# Patient Record
Sex: FEMALE | Race: WHITE | Employment: OTHER | URBAN - METROPOLITAN AREA
[De-identification: names, ages, dates, MRNs, and addresses within clinical notes are randomized per-mention and may not be internally consistent; named-entity substitution may affect disease eponyms.]

---

## 2023-08-15 ENCOUNTER — HOSPITAL ENCOUNTER (INPATIENT)
Age: 83
LOS: 3 days | Discharge: HOME | DRG: 417 | End: 2023-08-18
Attending: STUDENT IN AN ORGANIZED HEALTH CARE EDUCATION/TRAINING PROGRAM | Admitting: HOSPITALIST
Payer: MEDICARE

## 2023-08-15 ENCOUNTER — APPOINTMENT (OUTPATIENT)
Dept: CT IMAGING | Age: 83
DRG: 417 | End: 2023-08-15
Payer: MEDICARE

## 2023-08-15 ENCOUNTER — APPOINTMENT (OUTPATIENT)
Dept: GENERAL RADIOLOGY | Age: 83
DRG: 417 | End: 2023-08-15
Payer: MEDICARE

## 2023-08-15 DIAGNOSIS — K85.90 ACUTE PANCREATITIS, UNSPECIFIED COMPLICATION STATUS, UNSPECIFIED PANCREATITIS TYPE: Primary | ICD-10-CM

## 2023-08-15 DIAGNOSIS — K81.0 ACUTE CHOLECYSTITIS: ICD-10-CM

## 2023-08-15 LAB
ALBUMIN SERPL-MCNC: 3.8 G/DL (ref 3.5–5.2)
ALBUMIN/GLOB SERPL: 1.5 {RATIO} (ref 1–2.5)
ALP SERPL-CCNC: 93 U/L (ref 35–104)
ALT SERPL-CCNC: 13 U/L (ref 5–33)
ANION GAP SERPL CALCULATED.3IONS-SCNC: 12 MMOL/L (ref 9–17)
AST SERPL-CCNC: 23 U/L
BASOPHILS # BLD: 0 K/UL (ref 0–0.2)
BASOPHILS NFR BLD: 1 % (ref 0–2)
BILIRUB DIRECT SERPL-MCNC: 0.1 MG/DL
BILIRUB INDIRECT SERPL-MCNC: 0.1 MG/DL (ref 0–1)
BILIRUB SERPL-MCNC: 0.2 MG/DL (ref 0.3–1.2)
BUN SERPL-MCNC: 9 MG/DL (ref 8–23)
CALCIUM SERPL-MCNC: 9.1 MG/DL (ref 8.6–10.4)
CHLORIDE SERPL-SCNC: 101 MMOL/L (ref 98–107)
CO2 SERPL-SCNC: 20 MMOL/L (ref 20–31)
CREAT SERPL-MCNC: 0.6 MG/DL (ref 0.5–0.9)
EOSINOPHIL # BLD: 0.1 K/UL (ref 0–0.4)
EOSINOPHILS RELATIVE PERCENT: 1 % (ref 1–4)
ERYTHROCYTE [DISTWIDTH] IN BLOOD BY AUTOMATED COUNT: 16 % (ref 12.5–15.4)
GFR SERPL CREATININE-BSD FRML MDRD: >60 ML/MIN/1.73M2
GLUCOSE SERPL-MCNC: 144 MG/DL (ref 70–99)
HCT VFR BLD AUTO: 34.9 % (ref 36–46)
HGB BLD-MCNC: 11.7 G/DL (ref 12–16)
LACTATE BLDV-SCNC: 1.5 MMOL/L (ref 0.5–2.2)
LIPASE SERPL-CCNC: 1080 U/L (ref 13–60)
LYMPHOCYTES NFR BLD: 2.2 K/UL (ref 1–4.8)
LYMPHOCYTES RELATIVE PERCENT: 36 % (ref 24–44)
MAGNESIUM SERPL-MCNC: 2 MG/DL (ref 1.6–2.6)
MCH RBC QN AUTO: 29.4 PG (ref 26–34)
MCHC RBC AUTO-ENTMCNC: 33.7 G/DL (ref 31–37)
MCV RBC AUTO: 87.2 FL (ref 80–100)
MONOCYTES NFR BLD: 0.8 K/UL (ref 0.1–1.2)
MONOCYTES NFR BLD: 12 % (ref 2–11)
NEUTROPHILS NFR BLD: 50 % (ref 36–66)
NEUTS SEG NFR BLD: 3.1 K/UL (ref 1.8–7.7)
PLATELET # BLD AUTO: 260 K/UL (ref 140–450)
PMV BLD AUTO: 6.6 FL (ref 6–12)
POTASSIUM SERPL-SCNC: 4.1 MMOL/L (ref 3.7–5.3)
PROT SERPL-MCNC: 6.3 G/DL (ref 6.4–8.3)
RBC # BLD AUTO: 4 M/UL (ref 4–5.2)
SODIUM SERPL-SCNC: 133 MMOL/L (ref 135–144)
TROPONIN I SERPL HS-MCNC: 7 NG/L (ref 0–14)
WBC OTHER # BLD: 6.2 K/UL (ref 3.5–11)

## 2023-08-15 PROCEDURE — 71046 X-RAY EXAM CHEST 2 VIEWS: CPT

## 2023-08-15 PROCEDURE — 36415 COLL VENOUS BLD VENIPUNCTURE: CPT

## 2023-08-15 PROCEDURE — 83605 ASSAY OF LACTIC ACID: CPT

## 2023-08-15 PROCEDURE — 99285 EMERGENCY DEPT VISIT HI MDM: CPT

## 2023-08-15 PROCEDURE — 96374 THER/PROPH/DIAG INJ IV PUSH: CPT

## 2023-08-15 PROCEDURE — 2580000003 HC RX 258: Performed by: STUDENT IN AN ORGANIZED HEALTH CARE EDUCATION/TRAINING PROGRAM

## 2023-08-15 PROCEDURE — 80048 BASIC METABOLIC PNL TOTAL CA: CPT

## 2023-08-15 PROCEDURE — 74176 CT ABD & PELVIS W/O CONTRAST: CPT

## 2023-08-15 PROCEDURE — 85025 COMPLETE CBC W/AUTO DIFF WBC: CPT

## 2023-08-15 PROCEDURE — 96375 TX/PRO/DX INJ NEW DRUG ADDON: CPT

## 2023-08-15 PROCEDURE — 83690 ASSAY OF LIPASE: CPT

## 2023-08-15 PROCEDURE — 2500000003 HC RX 250 WO HCPCS: Performed by: STUDENT IN AN ORGANIZED HEALTH CARE EDUCATION/TRAINING PROGRAM

## 2023-08-15 PROCEDURE — 6360000002 HC RX W HCPCS: Performed by: STUDENT IN AN ORGANIZED HEALTH CARE EDUCATION/TRAINING PROGRAM

## 2023-08-15 PROCEDURE — 83735 ASSAY OF MAGNESIUM: CPT

## 2023-08-15 PROCEDURE — 80076 HEPATIC FUNCTION PANEL: CPT

## 2023-08-15 PROCEDURE — 1200000000 HC SEMI PRIVATE

## 2023-08-15 PROCEDURE — 93005 ELECTROCARDIOGRAM TRACING: CPT

## 2023-08-15 PROCEDURE — 84484 ASSAY OF TROPONIN QUANT: CPT

## 2023-08-15 PROCEDURE — A4216 STERILE WATER/SALINE, 10 ML: HCPCS | Performed by: STUDENT IN AN ORGANIZED HEALTH CARE EDUCATION/TRAINING PROGRAM

## 2023-08-15 RX ORDER — 0.9 % SODIUM CHLORIDE 0.9 %
1000 INTRAVENOUS SOLUTION INTRAVENOUS ONCE
Status: COMPLETED | OUTPATIENT
Start: 2023-08-15 | End: 2023-08-16

## 2023-08-15 RX ORDER — MORPHINE SULFATE 4 MG/ML
4 INJECTION, SOLUTION INTRAMUSCULAR; INTRAVENOUS ONCE
Status: COMPLETED | OUTPATIENT
Start: 2023-08-15 | End: 2023-08-15

## 2023-08-15 RX ORDER — ONDANSETRON 2 MG/ML
4 INJECTION INTRAMUSCULAR; INTRAVENOUS ONCE
Status: COMPLETED | OUTPATIENT
Start: 2023-08-15 | End: 2023-08-15

## 2023-08-15 RX ADMIN — HYDROMORPHONE HYDROCHLORIDE 1 MG: 1 INJECTION, SOLUTION INTRAMUSCULAR; INTRAVENOUS; SUBCUTANEOUS at 23:10

## 2023-08-15 RX ADMIN — ONDANSETRON 4 MG: 2 INJECTION INTRAMUSCULAR; INTRAVENOUS at 22:52

## 2023-08-15 RX ADMIN — SODIUM CHLORIDE 1000 ML: 9 INJECTION, SOLUTION INTRAVENOUS at 22:49

## 2023-08-15 RX ADMIN — MORPHINE SULFATE 4 MG: 4 INJECTION INTRAVENOUS at 22:50

## 2023-08-15 RX ADMIN — FAMOTIDINE 20 MG: 10 INJECTION, SOLUTION INTRAVENOUS at 22:54

## 2023-08-15 ASSESSMENT — PAIN DESCRIPTION - LOCATION
LOCATION: ABDOMEN

## 2023-08-15 ASSESSMENT — PAIN SCALES - GENERAL: PAINLEVEL_OUTOF10: 10

## 2023-08-15 ASSESSMENT — PAIN - FUNCTIONAL ASSESSMENT: PAIN_FUNCTIONAL_ASSESSMENT: 0-10

## 2023-08-15 ASSESSMENT — PAIN DESCRIPTION - DESCRIPTORS: DESCRIPTORS: SHARP

## 2023-08-15 ASSESSMENT — PAIN DESCRIPTION - ORIENTATION
ORIENTATION: MID
ORIENTATION: MID;UPPER

## 2023-08-15 ASSESSMENT — PAIN DESCRIPTION - PAIN TYPE: TYPE: ACUTE PAIN

## 2023-08-16 ENCOUNTER — APPOINTMENT (OUTPATIENT)
Dept: MRI IMAGING | Age: 83
DRG: 417 | End: 2023-08-16
Payer: MEDICARE

## 2023-08-16 ENCOUNTER — ANESTHESIA EVENT (OUTPATIENT)
Dept: OPERATING ROOM | Age: 83
End: 2023-08-16
Payer: MEDICARE

## 2023-08-16 LAB
BACTERIA URNS QL MICRO: ABNORMAL
BILIRUB UR QL STRIP: NEGATIVE
CA-I BLD-SCNC: 1.15 MMOL/L (ref 1.13–1.33)
CHARACTER UR: ABNORMAL
CLARITY UR: CLEAR
COLOR UR: YELLOW
EKG ATRIAL RATE: 51 BPM
EKG P AXIS: 64 DEGREES
EKG P-R INTERVAL: 200 MS
EKG Q-T INTERVAL: 460 MS
EKG QRS DURATION: 76 MS
EKG QTC CALCULATION (BAZETT): 423 MS
EKG R AXIS: 67 DEGREES
EKG T AXIS: 51 DEGREES
EKG VENTRICULAR RATE: 51 BPM
EPI CELLS #/AREA URNS HPF: ABNORMAL /HPF (ref 0–5)
GLUCOSE UR STRIP-MCNC: NEGATIVE MG/DL
HGB UR QL STRIP.AUTO: ABNORMAL
INR PPP: 1.1
KETONES UR STRIP-MCNC: ABNORMAL MG/DL
LEUKOCYTE ESTERASE UR QL STRIP: ABNORMAL
LIPASE SERPL-CCNC: 648 U/L (ref 13–60)
MAGNESIUM SERPL-MCNC: 1.8 MG/DL (ref 1.6–2.6)
MUCOUS THREADS URNS QL MICRO: ABNORMAL
NITRITE UR QL STRIP: NEGATIVE
PH UR STRIP: 6 [PH] (ref 5–8)
PHOSPHATE SERPL-MCNC: 3.3 MG/DL (ref 2.6–4.5)
PROT UR STRIP-MCNC: NEGATIVE MG/DL
PROTHROMBIN TIME: 11.4 SEC (ref 9.4–12.6)
RBC #/AREA URNS HPF: ABNORMAL /HPF (ref 0–2)
SP GR UR STRIP: 1.02 (ref 1–1.03)
TRIGL SERPL-MCNC: 40 MG/DL
TRIGL SERPL-MCNC: 44 MG/DL
UROBILINOGEN UR STRIP-ACNC: NORMAL EU/DL (ref 0–1)
WBC #/AREA URNS HPF: ABNORMAL /HPF (ref 0–5)

## 2023-08-16 PROCEDURE — 99222 1ST HOSP IP/OBS MODERATE 55: CPT | Performed by: HOSPITALIST

## 2023-08-16 PROCEDURE — 6360000002 HC RX W HCPCS: Performed by: NURSE PRACTITIONER

## 2023-08-16 PROCEDURE — 36415 COLL VENOUS BLD VENIPUNCTURE: CPT

## 2023-08-16 PROCEDURE — 74181 MRI ABDOMEN W/O CONTRAST: CPT

## 2023-08-16 PROCEDURE — 84478 ASSAY OF TRIGLYCERIDES: CPT

## 2023-08-16 PROCEDURE — 2580000003 HC RX 258: Performed by: NURSE PRACTITIONER

## 2023-08-16 PROCEDURE — 85610 PROTHROMBIN TIME: CPT

## 2023-08-16 PROCEDURE — 6360000002 HC RX W HCPCS: Performed by: HOSPITALIST

## 2023-08-16 PROCEDURE — 82330 ASSAY OF CALCIUM: CPT

## 2023-08-16 PROCEDURE — 81001 URINALYSIS AUTO W/SCOPE: CPT

## 2023-08-16 PROCEDURE — 83735 ASSAY OF MAGNESIUM: CPT

## 2023-08-16 PROCEDURE — 1200000000 HC SEMI PRIVATE

## 2023-08-16 PROCEDURE — 83690 ASSAY OF LIPASE: CPT

## 2023-08-16 PROCEDURE — 97166 OT EVAL MOD COMPLEX 45 MIN: CPT

## 2023-08-16 PROCEDURE — 6370000000 HC RX 637 (ALT 250 FOR IP): Performed by: HOSPITALIST

## 2023-08-16 PROCEDURE — 84100 ASSAY OF PHOSPHORUS: CPT

## 2023-08-16 PROCEDURE — 97535 SELF CARE MNGMENT TRAINING: CPT

## 2023-08-16 RX ORDER — SODIUM CHLORIDE 9 MG/ML
INJECTION, SOLUTION INTRAVENOUS CONTINUOUS
Status: DISCONTINUED | OUTPATIENT
Start: 2023-08-16 | End: 2023-08-18

## 2023-08-16 RX ORDER — DIPHENHYDRAMINE HCL 25 MG
25 TABLET ORAL EVERY 6 HOURS PRN
Status: DISCONTINUED | OUTPATIENT
Start: 2023-08-16 | End: 2023-08-18 | Stop reason: HOSPADM

## 2023-08-16 RX ORDER — ASPIRIN 325 MG
325 TABLET ORAL DAILY
Status: DISCONTINUED | OUTPATIENT
Start: 2023-08-16 | End: 2023-08-18 | Stop reason: HOSPADM

## 2023-08-16 RX ORDER — LISINOPRIL 10 MG/1
10 TABLET ORAL DAILY
Status: DISCONTINUED | OUTPATIENT
Start: 2023-08-16 | End: 2023-08-16

## 2023-08-16 RX ORDER — PANTOPRAZOLE SODIUM 40 MG/1
40 TABLET, DELAYED RELEASE ORAL
Status: DISCONTINUED | OUTPATIENT
Start: 2023-08-17 | End: 2023-08-18 | Stop reason: HOSPADM

## 2023-08-16 RX ORDER — OMEPRAZOLE 20 MG/1
20 CAPSULE, DELAYED RELEASE ORAL DAILY
COMMUNITY

## 2023-08-16 RX ORDER — EZETIMIBE 10 MG/1
10 TABLET ORAL DAILY
COMMUNITY

## 2023-08-16 RX ORDER — ALPRAZOLAM 0.25 MG/1
0.25 TABLET ORAL NIGHTLY PRN
COMMUNITY

## 2023-08-16 RX ORDER — LATANOPROST 50 UG/ML
1 SOLUTION/ DROPS OPHTHALMIC NIGHTLY
Status: DISCONTINUED | OUTPATIENT
Start: 2023-08-16 | End: 2023-08-18 | Stop reason: HOSPADM

## 2023-08-16 RX ORDER — MAGNESIUM SULFATE 1 G/100ML
1000 INJECTION INTRAVENOUS PRN
Status: DISCONTINUED | OUTPATIENT
Start: 2023-08-16 | End: 2023-08-18 | Stop reason: HOSPADM

## 2023-08-16 RX ORDER — METOPROLOL SUCCINATE 50 MG/1
50 TABLET, EXTENDED RELEASE ORAL DAILY
COMMUNITY

## 2023-08-16 RX ORDER — LISINOPRIL 10 MG/1
10 TABLET ORAL NIGHTLY
Status: DISCONTINUED | OUTPATIENT
Start: 2023-08-16 | End: 2023-08-18 | Stop reason: HOSPADM

## 2023-08-16 RX ORDER — DIPHENHYDRAMINE HCL 25 MG
25 CAPSULE ORAL EVERY 6 HOURS PRN
COMMUNITY

## 2023-08-16 RX ORDER — POTASSIUM CHLORIDE 7.45 MG/ML
10 INJECTION INTRAVENOUS PRN
Status: DISCONTINUED | OUTPATIENT
Start: 2023-08-16 | End: 2023-08-18 | Stop reason: HOSPADM

## 2023-08-16 RX ORDER — SODIUM CHLORIDE 0.9 % (FLUSH) 0.9 %
5-40 SYRINGE (ML) INJECTION PRN
Status: DISCONTINUED | OUTPATIENT
Start: 2023-08-16 | End: 2023-08-18 | Stop reason: HOSPADM

## 2023-08-16 RX ORDER — SODIUM CHLORIDE 9 MG/ML
INJECTION, SOLUTION INTRAVENOUS PRN
Status: DISCONTINUED | OUTPATIENT
Start: 2023-08-16 | End: 2023-08-18 | Stop reason: HOSPADM

## 2023-08-16 RX ORDER — EZETIMIBE 10 MG/1
10 TABLET ORAL DAILY
Status: DISCONTINUED | OUTPATIENT
Start: 2023-08-16 | End: 2023-08-18 | Stop reason: HOSPADM

## 2023-08-16 RX ORDER — LISINOPRIL 10 MG/1
10 TABLET ORAL DAILY
COMMUNITY

## 2023-08-16 RX ORDER — METOPROLOL SUCCINATE 50 MG/1
50 TABLET, EXTENDED RELEASE ORAL DAILY
Status: DISCONTINUED | OUTPATIENT
Start: 2023-08-16 | End: 2023-08-18 | Stop reason: HOSPADM

## 2023-08-16 RX ORDER — ENOXAPARIN SODIUM 100 MG/ML
40 INJECTION SUBCUTANEOUS DAILY
Status: DISCONTINUED | OUTPATIENT
Start: 2023-08-16 | End: 2023-08-17

## 2023-08-16 RX ORDER — SODIUM CHLORIDE 0.9 % (FLUSH) 0.9 %
5-40 SYRINGE (ML) INJECTION EVERY 12 HOURS SCHEDULED
Status: DISCONTINUED | OUTPATIENT
Start: 2023-08-16 | End: 2023-08-18 | Stop reason: HOSPADM

## 2023-08-16 RX ORDER — ESTRADIOL 0.1 MG/G
2 CREAM VAGINAL
COMMUNITY

## 2023-08-16 RX ORDER — ESTRADIOL 0.1 MG/G
2 CREAM VAGINAL
Status: DISCONTINUED | OUTPATIENT
Start: 2023-08-16 | End: 2023-08-18 | Stop reason: HOSPADM

## 2023-08-16 RX ORDER — ASPIRIN 325 MG
325 TABLET ORAL DAILY
COMMUNITY

## 2023-08-16 RX ORDER — ONDANSETRON 2 MG/ML
4 INJECTION INTRAMUSCULAR; INTRAVENOUS EVERY 6 HOURS PRN
Status: DISCONTINUED | OUTPATIENT
Start: 2023-08-16 | End: 2023-08-18 | Stop reason: HOSPADM

## 2023-08-16 RX ORDER — ALPRAZOLAM 0.25 MG/1
0.25 TABLET ORAL NIGHTLY PRN
Status: DISCONTINUED | OUTPATIENT
Start: 2023-08-16 | End: 2023-08-18 | Stop reason: HOSPADM

## 2023-08-16 RX ORDER — PROCHLORPERAZINE EDISYLATE 5 MG/ML
10 INJECTION INTRAMUSCULAR; INTRAVENOUS EVERY 6 HOURS PRN
Status: DISCONTINUED | OUTPATIENT
Start: 2023-08-16 | End: 2023-08-18 | Stop reason: HOSPADM

## 2023-08-16 RX ORDER — ONDANSETRON 4 MG/1
4 TABLET, ORALLY DISINTEGRATING ORAL EVERY 8 HOURS PRN
Status: DISCONTINUED | OUTPATIENT
Start: 2023-08-16 | End: 2023-08-18 | Stop reason: HOSPADM

## 2023-08-16 RX ADMIN — ENOXAPARIN SODIUM 40 MG: 40 INJECTION SUBCUTANEOUS at 09:36

## 2023-08-16 RX ADMIN — ESTRADIOL 2 G: 0.1 CREAM VAGINAL at 20:35

## 2023-08-16 RX ADMIN — SODIUM CHLORIDE, PRESERVATIVE FREE 10 ML: 5 INJECTION INTRAVENOUS at 20:09

## 2023-08-16 RX ADMIN — LATANOPROST 1 DROP: 50 SOLUTION OPHTHALMIC at 20:49

## 2023-08-16 RX ADMIN — SODIUM CHLORIDE: 9 INJECTION, SOLUTION INTRAVENOUS at 19:23

## 2023-08-16 RX ADMIN — ONDANSETRON 4 MG: 2 INJECTION INTRAMUSCULAR; INTRAVENOUS at 20:30

## 2023-08-16 RX ADMIN — SODIUM CHLORIDE: 9 INJECTION, SOLUTION INTRAVENOUS at 11:28

## 2023-08-16 RX ADMIN — PROCHLORPERAZINE EDISYLATE 10 MG: 5 INJECTION INTRAMUSCULAR; INTRAVENOUS at 15:26

## 2023-08-16 RX ADMIN — PROCHLORPERAZINE EDISYLATE 10 MG: 5 INJECTION INTRAMUSCULAR; INTRAVENOUS at 09:36

## 2023-08-16 RX ADMIN — LISINOPRIL 10 MG: 10 TABLET ORAL at 22:09

## 2023-08-16 RX ADMIN — HYDROMORPHONE HYDROCHLORIDE 0.5 MG: 1 INJECTION, SOLUTION INTRAMUSCULAR; INTRAVENOUS; SUBCUTANEOUS at 02:24

## 2023-08-16 RX ADMIN — SODIUM CHLORIDE: 9 INJECTION, SOLUTION INTRAVENOUS at 01:17

## 2023-08-16 RX ADMIN — HYDROMORPHONE HYDROCHLORIDE 0.5 MG: 1 INJECTION, SOLUTION INTRAMUSCULAR; INTRAVENOUS; SUBCUTANEOUS at 12:00

## 2023-08-16 RX ADMIN — HYDROMORPHONE HYDROCHLORIDE 0.5 MG: 1 INJECTION, SOLUTION INTRAMUSCULAR; INTRAVENOUS; SUBCUTANEOUS at 05:24

## 2023-08-16 RX ADMIN — ONDANSETRON 4 MG: 2 INJECTION INTRAMUSCULAR; INTRAVENOUS at 05:26

## 2023-08-16 RX ADMIN — ONDANSETRON 4 MG: 2 INJECTION INTRAMUSCULAR; INTRAVENOUS at 11:59

## 2023-08-16 ASSESSMENT — PAIN SCALES - GENERAL
PAINLEVEL_OUTOF10: 7
PAINLEVEL_OUTOF10: 10
PAINLEVEL_OUTOF10: 0
PAINLEVEL_OUTOF10: 4

## 2023-08-16 ASSESSMENT — PAIN DESCRIPTION - LOCATION: LOCATION: ABDOMEN

## 2023-08-16 NOTE — ED NOTES
SBAR report to [facility] for admission     Keesha Cornejo RN  08/16/23 7543

## 2023-08-16 NOTE — PROGRESS NOTES
Physical Therapy      Physical Therapy Cancel Note      DATE: 2023    NAME: Cheyenne Webber  MRN: [id number]   : 1940      Patient not seen this date for Physical Therapy due to: Other: Patient out of room for MRI Abdomen. Will continue to pursue PT evaluation as able.        Electronically signed by Barbara Camilo PT on 2023 at 9:55 AM

## 2023-08-16 NOTE — PROGRESS NOTES
No  Subjective  Subjective: Patient reports 8/10 abdominal pain . General Comment  Comments: Patient had several episodes of vomiting throughout session. Social/Functional History  Social/Functional History  Lives With: Spouse, Son (adult son works nights)  Type of Home: Condo  Home Layout: Two level, Able to Live on Main level with bedroom/bathroom  Home Access: Stairs to enter without rails  Entrance Stairs - Number of Steps: 1  Bathroom Shower/Tub: Walk-in shower, Shower chair without back  Bathroom Toilet: Standard  Bathroom Equipment: Grab bars around toilet, Grab bars in shower, Hand-held shower, Shower chair  Home Equipment: Grab bars  Has the patient had two or more falls in the past year or any fall with injury in the past year?: No  ADL Assistance: [address] Independent (hired cleaning 2x/month)  Ambulation Assistance: Independent  Transfer Assistance: Independent       Objective              Safety Devices  Type of Devices: Call light within reach; Bed alarm in place; Left in bed;Nurse notified  Restraints  Restraints Initially in Place: No    Balance  Sitting: Intact  Standing: Impaired (static-SBA, dynamic-CGA)    Functional mobility  Overall Level of Assistance: Contact-guard assistance (Pt sitting on BSC up on entering. BSC to bed, patient declined up in chair due to nausea/vomiting)  Interventions: Verbal cues  Assistive Device:  (none)    Toilet Transfers  Toilet - Technique: Stand step  Equipment Used: Standard bedside commode  Toilet Transfer: Stand by assistance    AROM: Within functional limits  Strength:  Within functional limits  Coordination: Within functional limits    ADL  Feeding: Independent  Feeding Skilled Clinical Factors: ROM/strength WFL for completion  Grooming: Minimal assistance  Grooming Skilled Clinical Factors: in supine with head of bed elevated, patient able to brush teeth and began to brush partial and then began vomiting, assist to finish partial. Pt able to remove and replace partial in mouth. UE Bathing: Stand by assistance  UE Bathing Skilled Clinical Factors: wash face/hands  LE Bathing: Contact guard assistance  LE Bathing Skilled Clinical Factors: clinical judgement based on strength, balance and functional mobility  UE Dressing: Stand by assistance  UE Dressing Skilled Clinical Factors: clinical judgement based on strength, balance and functional mobility  LE Dressing: Maximum assistance  LE Dressing Skilled Clinical Factors: willy slipper socks due to vomiting  Toileting: Stand by assistance  Toileting Skilled Clinical Factors: hygiene and clothing management        Bed mobility  Sit to Supine: Stand by assistance  Scooting: Independent  Bed Mobility Comments: patient sitting on BSC upon entering room    Transfers  Sit to stand: Stand by assistance  Stand to sit: Stand by assistance    Vision  Vision: Impaired  Vision Exceptions: Wears glasses at all times (has glaucoma)  Hearing  Hearing: Within functional limits  Cognition  Overall Cognitive Status: WNL  Orientation  Overall Orientation Status: Within Normal Limits  Orientation Level: Oriented X4                  Education Given To: Patient  Education Provided: Transfer Training;Role of Therapy;Plan of Care;ADL Adaptive Strategies; Fall Prevention Strategies  Education Method: Demonstration;Verbal  Education Outcome: Verbalized understanding;Continued education needed           Hand Dominance  Hand Dominance: Right         AM-PAC Score        AM-PAC Inpatient Daily Activity Raw Score: 18 (08/16/23 0911)  AM-PAC Inpatient ADL T-Scale Score : 38.66 (08/16/23 0911)  ADL Inpatient CMS 0-100% Score: 46.65 (08/16/23 0911)  ADL Inpatient CMS G-Code Modifier : CK (08/16/23 0911)        Goals  Short Term Goals  Time Frame for Short Term Goals: 14 visits  Short Term Goal 1: Pt will complete total body bath/dress with MOD IND  Short Term Goal 2: Pt will complete grooming sink side with MOD IND  Short

## 2023-08-16 NOTE — H&P
[facility]  Office: [phone]  Mitzi Rodas, DO, Krys Whitman, DO, Darrick Essex, DO, Nenita Kulkarni, DO, Cristin Gurrola MD, Lloyd Manzo MD, Chris Gustafson MD, Kait Gonzalez MD,  Kelly Briggs MD, Juanpablo Ortega MD, Nan Vogt, DO, Anastasia Rios MD,  Rusty Gamboa MD, Cathleen Vizcaino MD, Marilynn Russell DO, Shabbir Siddiqui MD,  Kelby Mahajan, DO, Beverly Webb MD, Felicia Fan MD, Eva Harris MD, Julisa Benoit MD,  Tomas Ngo MD, Bethany Segura MD, Rocio Vazquez MD, Johanne Lucas DO, Santana Lang MD,  Lidia Lang MD, Shirley Abraham, Anson Tsang, CNP, Tamra Cerna, CNP, Cullen Dominique, CNP,  Jerilyn Sesay, LINDA, Edgar Olivarez, CNP, Kenia Diallo, CNP, Gaviota Masters, CNP, Jesús Cope CNP, Clare Bahena, CNP, Denver Robertson PA-C, Andi Giron, JEFFREY, Thierry Botello CNP, Magaly Menard, [address] [place] [address] Drive    HISTORY AND PHYSICAL EXAMINATION            Date:   8/16/2023  Patient name:  Adalgisa Gaona  Date of admission:  8/15/2023  9:47 PM  MRN:   [id number]  Account:  [de-identified]  YOB: 1940  PCP:    No primary care provider on file. Room:   [address] [place] [address]  Code Status:    Full Code      History Obtained From:     patient, electronic medical record    History of Present Illness:     Adalgisa Gaona is a 81 y.o. Non- / non  female who presents with Abdominal Pain (Upper quadrants abdominal pains that started about 1 hour ago. Onset after eating dinner tonight. )   and is admitted to the hospital for the management of Acute pancreatitis without infection or necrosis. This very pleasant 41-year-old female presents to the hospital with acute onset of upper abdominal pain. The patient has been found to have acute pancreatitis. She does not have any significant risk factors for acute pancreatitis outside of known cholelithiasis.   She does not drink steatosis. No focal liver lesion noting some decreased sensitivity on this unenhanced study. Assessment :      Hospital Problems             Last Modified POA    * (Principal) Acute pancreatitis without infection or necrosis 8/15/2023 Yes       Plan:     Acute pancreatitis/acute cholecystitis  Suspect patient may have had microlithiasis with a passed stone causing her acute pancreatitis  Lipase improving  Consult general surgery due to acute cholecystitis noted on MRCP  Continue Dilaudid for pain control  Okay for clear liquid diet for now  Continue Compazine for nausea  Essential hypertension  Resume lisinopril and Toprol  Anxiety  Continue Xanax    Patient is admitted as inpatient status because of co-morbidities listed above, severity of signs and symptoms as outlined, requirement for current medical therapies and most importantly because of direct risk to patient if care not provided in a hospital setting. Expected length of stay > 48 hours. Patient is admitted in the Med/Surge    Medical Decision Makin Arlette Valera DO  2023  1:00 PM    Copy sent to Dr. Avtar Wong primary care provider on file.

## 2023-08-16 NOTE — ED PROVIDER NOTES
[facility]  Emergency Department Encounter     Pt Yony Kraft  MRN: [id number]  [address] 1940  Date of evaluation: 8/15/23  PCP:  No primary care provider on file. CHIEF COMPLAINT       Chief Complaint   Patient presents with    Abdominal Pain     Upper quadrants abdominal pains that started about 1 hour ago. Onset after eating dinner tonight. HISTORY OF PRESENT ILLNESS  (Location/Symptom, Timing/Onset, Context/Setting, Quality, Duration, Modifying Factors, Severity.)      Keena Bryan is a 81 y.o. female who presents with acute sudden onset abdominal pain, midepigastric pain about 1 hour to arrival.  No medications prior to arrival.  Started all of a sudden. She felt like her bra strap may have been a little bit tight and therefore she loosened although that did not help the pain. Reports sharp pain. No radiation. No chest pain. No shortness of breath. Did become diaphoretic at the time. Sweaty at the time. Did attempt to vomit and that did not help with her pain either. Positive for nausea. Positive for vomiting. No blood in her emesis. No diarrhea. No constipation. Past medical history significant for nonalcoholic fatty liver. PAST MEDICAL / SURGICAL / SOCIAL / FAMILY HISTORY      has no past medical history on file. has no past surgical history on file.       Social History     Socioeconomic History    Marital status:      Spouse name: Not on file    Number of children: Not on file    Years of education: Not on file    Highest education level: Not on file   Occupational History    Not on file   Tobacco Use    Smoking status: Not on file    Smokeless tobacco: Not on file   Substance and Sexual Activity    Alcohol use: Not on file    Drug use: Not on file    Sexual activity: Not on file   Other Topics Concern    Not on file   Social History Narrative    Not on file     Social Determinants of Health     Financial

## 2023-08-17 ENCOUNTER — ANESTHESIA (OUTPATIENT)
Dept: OPERATING ROOM | Age: 83
End: 2023-08-17
Payer: MEDICARE

## 2023-08-17 LAB
ALBUMIN SERPL-MCNC: 3 G/DL (ref 3.5–5.2)
ALBUMIN/GLOB SERPL: 1.3 {RATIO} (ref 1–2.5)
ALP SERPL-CCNC: 68 U/L (ref 35–104)
ALT SERPL-CCNC: 11 U/L (ref 5–33)
ANION GAP SERPL CALCULATED.3IONS-SCNC: 8 MMOL/L (ref 9–17)
AST SERPL-CCNC: 25 U/L
BILIRUB SERPL-MCNC: 0.5 MG/DL (ref 0.3–1.2)
BUN SERPL-MCNC: 12 MG/DL (ref 8–23)
CALCIUM SERPL-MCNC: 8 MG/DL (ref 8.6–10.4)
CHLORIDE SERPL-SCNC: 108 MMOL/L (ref 98–107)
CO2 SERPL-SCNC: 19 MMOL/L (ref 20–31)
CREAT SERPL-MCNC: 0.4 MG/DL (ref 0.5–0.9)
EKG ATRIAL RATE: 96 BPM
EKG P AXIS: 73 DEGREES
EKG P-R INTERVAL: 184 MS
EKG Q-T INTERVAL: 350 MS
EKG QRS DURATION: 88 MS
EKG QTC CALCULATION (BAZETT): 442 MS
EKG R AXIS: 66 DEGREES
EKG T AXIS: -178 DEGREES
EKG VENTRICULAR RATE: 96 BPM
GFR SERPL CREATININE-BSD FRML MDRD: >60 ML/MIN/1.73M2
GLUCOSE BLD-MCNC: 167 MG/DL (ref 65–105)
GLUCOSE BLD-MCNC: 99 MG/DL (ref 65–105)
GLUCOSE SERPL-MCNC: 98 MG/DL (ref 70–99)
LIPASE SERPL-CCNC: 22 U/L (ref 13–60)
POTASSIUM SERPL-SCNC: 4.4 MMOL/L (ref 3.7–5.3)
PROT SERPL-MCNC: 5.4 G/DL (ref 6.4–8.3)
SODIUM SERPL-SCNC: 135 MMOL/L (ref 135–144)
TROPONIN I SERPL HS-MCNC: 11 NG/L (ref 0–14)

## 2023-08-17 PROCEDURE — 6360000002 HC RX W HCPCS: Performed by: STUDENT IN AN ORGANIZED HEALTH CARE EDUCATION/TRAINING PROGRAM

## 2023-08-17 PROCEDURE — 1200000000 HC SEMI PRIVATE

## 2023-08-17 PROCEDURE — 51701 INSERT BLADDER CATHETER: CPT

## 2023-08-17 PROCEDURE — 51798 US URINE CAPACITY MEASURE: CPT

## 2023-08-17 PROCEDURE — 97530 THERAPEUTIC ACTIVITIES: CPT

## 2023-08-17 PROCEDURE — 82947 ASSAY GLUCOSE BLOOD QUANT: CPT

## 2023-08-17 PROCEDURE — 7100000001 HC PACU RECOVERY - ADDTL 15 MIN: Performed by: SURGERY

## 2023-08-17 PROCEDURE — 2500000003 HC RX 250 WO HCPCS: Performed by: NURSE ANESTHETIST, CERTIFIED REGISTERED

## 2023-08-17 PROCEDURE — 2580000003 HC RX 258: Performed by: NURSE PRACTITIONER

## 2023-08-17 PROCEDURE — 6370000000 HC RX 637 (ALT 250 FOR IP): Performed by: STUDENT IN AN ORGANIZED HEALTH CARE EDUCATION/TRAINING PROGRAM

## 2023-08-17 PROCEDURE — 2720000010 HC SURG SUPPLY STERILE: Performed by: SURGERY

## 2023-08-17 PROCEDURE — 6360000002 HC RX W HCPCS

## 2023-08-17 PROCEDURE — 0FB44ZZ EXCISION OF GALLBLADDER, PERCUTANEOUS ENDOSCOPIC APPROACH: ICD-10-PCS | Performed by: SURGERY

## 2023-08-17 PROCEDURE — 6360000002 HC RX W HCPCS: Performed by: NURSE ANESTHETIST, CERTIFIED REGISTERED

## 2023-08-17 PROCEDURE — 36415 COLL VENOUS BLD VENIPUNCTURE: CPT

## 2023-08-17 PROCEDURE — 94760 N-INVAS EAR/PLS OXIMETRY 1: CPT

## 2023-08-17 PROCEDURE — 3600000019 HC SURGERY ROBOT ADDTL 15MIN: Performed by: SURGERY

## 2023-08-17 PROCEDURE — 99232 SBSQ HOSP IP/OBS MODERATE 35: CPT | Performed by: HOSPITALIST

## 2023-08-17 PROCEDURE — 84484 ASSAY OF TROPONIN QUANT: CPT

## 2023-08-17 PROCEDURE — 2709999900 HC NON-CHARGEABLE SUPPLY: Performed by: SURGERY

## 2023-08-17 PROCEDURE — 8E0W4CZ ROBOTIC ASSISTED PROCEDURE OF TRUNK REGION, PERCUTANEOUS ENDOSCOPIC APPROACH: ICD-10-PCS | Performed by: SURGERY

## 2023-08-17 PROCEDURE — 2500000003 HC RX 250 WO HCPCS

## 2023-08-17 PROCEDURE — 3700000001 HC ADD 15 MINUTES (ANESTHESIA): Performed by: SURGERY

## 2023-08-17 PROCEDURE — 80053 COMPREHEN METABOLIC PANEL: CPT

## 2023-08-17 PROCEDURE — 7100000000 HC PACU RECOVERY - FIRST 15 MIN: Performed by: SURGERY

## 2023-08-17 PROCEDURE — 2580000003 HC RX 258: Performed by: STUDENT IN AN ORGANIZED HEALTH CARE EDUCATION/TRAINING PROGRAM

## 2023-08-17 PROCEDURE — 88304 TISSUE EXAM BY PATHOLOGIST: CPT

## 2023-08-17 PROCEDURE — 2580000003 HC RX 258

## 2023-08-17 PROCEDURE — S2900 ROBOTIC SURGICAL SYSTEM: HCPCS | Performed by: SURGERY

## 2023-08-17 PROCEDURE — 83690 ASSAY OF LIPASE: CPT

## 2023-08-17 PROCEDURE — 6360000002 HC RX W HCPCS: Performed by: HOSPITALIST

## 2023-08-17 PROCEDURE — 3700000000 HC ANESTHESIA ATTENDED CARE: Performed by: SURGERY

## 2023-08-17 PROCEDURE — 2500000003 HC RX 250 WO HCPCS: Performed by: SURGERY

## 2023-08-17 PROCEDURE — 3600000009 HC SURGERY ROBOT BASE: Performed by: SURGERY

## 2023-08-17 RX ORDER — METRONIDAZOLE 500 MG/100ML
500 INJECTION, SOLUTION INTRAVENOUS EVERY 8 HOURS
Status: DISCONTINUED | OUTPATIENT
Start: 2023-08-17 | End: 2023-08-18 | Stop reason: HOSPADM

## 2023-08-17 RX ORDER — ONDANSETRON 2 MG/ML
INJECTION INTRAMUSCULAR; INTRAVENOUS PRN
Status: DISCONTINUED | OUTPATIENT
Start: 2023-08-17 | End: 2023-08-17 | Stop reason: SDUPTHER

## 2023-08-17 RX ORDER — CIPROFLOXACIN 2 MG/ML
400 INJECTION, SOLUTION INTRAVENOUS EVERY 12 HOURS
Status: DISCONTINUED | OUTPATIENT
Start: 2023-08-17 | End: 2023-08-18 | Stop reason: HOSPADM

## 2023-08-17 RX ORDER — ONDANSETRON 2 MG/ML
INJECTION INTRAMUSCULAR; INTRAVENOUS
Status: COMPLETED
Start: 2023-08-17 | End: 2023-08-17

## 2023-08-17 RX ORDER — CEFAZOLIN 2 G/1
INJECTION, POWDER, FOR SOLUTION INTRAMUSCULAR; INTRAVENOUS
Status: DISPENSED
Start: 2023-08-17 | End: 2023-08-17

## 2023-08-17 RX ORDER — PROPOFOL 10 MG/ML
INJECTION, EMULSION INTRAVENOUS PRN
Status: DISCONTINUED | OUTPATIENT
Start: 2023-08-17 | End: 2023-08-17 | Stop reason: SDUPTHER

## 2023-08-17 RX ORDER — FENTANYL CITRATE 50 UG/ML
INJECTION, SOLUTION INTRAMUSCULAR; INTRAVENOUS PRN
Status: DISCONTINUED | OUTPATIENT
Start: 2023-08-17 | End: 2023-08-17 | Stop reason: SDUPTHER

## 2023-08-17 RX ORDER — NEOSTIGMINE METHYLSULFATE 5 MG/5 ML
SYRINGE (ML) INTRAVENOUS PRN
Status: DISCONTINUED | OUTPATIENT
Start: 2023-08-17 | End: 2023-08-17 | Stop reason: SDUPTHER

## 2023-08-17 RX ORDER — METOCLOPRAMIDE HYDROCHLORIDE 5 MG/ML
10 INJECTION INTRAMUSCULAR; INTRAVENOUS
Status: DISCONTINUED | OUTPATIENT
Start: 2023-08-17 | End: 2023-08-17

## 2023-08-17 RX ORDER — LABETALOL HYDROCHLORIDE 5 MG/ML
10 INJECTION, SOLUTION INTRAVENOUS
Status: DISCONTINUED | OUTPATIENT
Start: 2023-08-17 | End: 2023-08-17

## 2023-08-17 RX ORDER — DIPHENHYDRAMINE HYDROCHLORIDE 50 MG/ML
12.5 INJECTION INTRAMUSCULAR; INTRAVENOUS
Status: DISCONTINUED | OUTPATIENT
Start: 2023-08-17 | End: 2023-08-17

## 2023-08-17 RX ORDER — ESMOLOL HYDROCHLORIDE 10 MG/ML
INJECTION INTRAVENOUS PRN
Status: DISCONTINUED | OUTPATIENT
Start: 2023-08-17 | End: 2023-08-17 | Stop reason: SDUPTHER

## 2023-08-17 RX ORDER — OXYCODONE HYDROCHLORIDE 5 MG/1
10 TABLET ORAL PRN
Status: DISCONTINUED | OUTPATIENT
Start: 2023-08-17 | End: 2023-08-17

## 2023-08-17 RX ORDER — MEPERIDINE HYDROCHLORIDE 50 MG/ML
12.5 INJECTION INTRAMUSCULAR; INTRAVENOUS; SUBCUTANEOUS ONCE
Status: COMPLETED | OUTPATIENT
Start: 2023-08-17 | End: 2023-08-17

## 2023-08-17 RX ORDER — ROCURONIUM BROMIDE 10 MG/ML
INJECTION, SOLUTION INTRAVENOUS PRN
Status: DISCONTINUED | OUTPATIENT
Start: 2023-08-17 | End: 2023-08-17 | Stop reason: SDUPTHER

## 2023-08-17 RX ORDER — MEPERIDINE HYDROCHLORIDE 50 MG/ML
INJECTION INTRAMUSCULAR; INTRAVENOUS; SUBCUTANEOUS
Status: COMPLETED
Start: 2023-08-17 | End: 2023-08-17

## 2023-08-17 RX ORDER — HYDRALAZINE HYDROCHLORIDE 20 MG/ML
10 INJECTION INTRAMUSCULAR; INTRAVENOUS
Status: DISCONTINUED | OUTPATIENT
Start: 2023-08-17 | End: 2023-08-17

## 2023-08-17 RX ORDER — INDOCYANINE GREEN AND WATER 25 MG
KIT INJECTION
Status: COMPLETED
Start: 2023-08-17 | End: 2023-08-17

## 2023-08-17 RX ORDER — MORPHINE SULFATE 2 MG/ML
1 INJECTION, SOLUTION INTRAMUSCULAR; INTRAVENOUS EVERY 5 MIN PRN
Status: DISCONTINUED | OUTPATIENT
Start: 2023-08-17 | End: 2023-08-17

## 2023-08-17 RX ORDER — METOPROLOL TARTRATE 5 MG/5ML
INJECTION INTRAVENOUS
Status: DISPENSED
Start: 2023-08-17 | End: 2023-08-17

## 2023-08-17 RX ORDER — OXYCODONE HYDROCHLORIDE 5 MG/1
5 TABLET ORAL PRN
Status: DISCONTINUED | OUTPATIENT
Start: 2023-08-17 | End: 2023-08-17

## 2023-08-17 RX ORDER — BUPIVACAINE HYDROCHLORIDE AND EPINEPHRINE 5; 5 MG/ML; UG/ML
INJECTION, SOLUTION EPIDURAL; INTRACAUDAL; PERINEURAL PRN
Status: DISCONTINUED | OUTPATIENT
Start: 2023-08-17 | End: 2023-08-17 | Stop reason: ALTCHOICE

## 2023-08-17 RX ORDER — MIDAZOLAM HYDROCHLORIDE 2 MG/2ML
2 INJECTION, SOLUTION INTRAMUSCULAR; INTRAVENOUS
Status: DISCONTINUED | OUTPATIENT
Start: 2023-08-17 | End: 2023-08-17

## 2023-08-17 RX ORDER — INDOCYANINE GREEN AND WATER 25 MG
5 KIT INJECTION ONCE
Status: COMPLETED | OUTPATIENT
Start: 2023-08-17 | End: 2023-08-17

## 2023-08-17 RX ORDER — ENOXAPARIN SODIUM 100 MG/ML
40 INJECTION SUBCUTANEOUS DAILY
Status: DISCONTINUED | OUTPATIENT
Start: 2023-08-18 | End: 2023-08-18 | Stop reason: HOSPADM

## 2023-08-17 RX ORDER — KETOROLAC TROMETHAMINE 30 MG/ML
INJECTION, SOLUTION INTRAMUSCULAR; INTRAVENOUS PRN
Status: DISCONTINUED | OUTPATIENT
Start: 2023-08-17 | End: 2023-08-17 | Stop reason: SDUPTHER

## 2023-08-17 RX ORDER — SODIUM CHLORIDE 0.9 % (FLUSH) 0.9 %
5-40 SYRINGE (ML) INJECTION PRN
Status: DISCONTINUED | OUTPATIENT
Start: 2023-08-17 | End: 2023-08-17

## 2023-08-17 RX ORDER — GLYCOPYRROLATE 0.2 MG/ML
INJECTION INTRAMUSCULAR; INTRAVENOUS PRN
Status: DISCONTINUED | OUTPATIENT
Start: 2023-08-17 | End: 2023-08-17 | Stop reason: SDUPTHER

## 2023-08-17 RX ORDER — DEXAMETHASONE SODIUM PHOSPHATE 10 MG/ML
INJECTION, SOLUTION INTRAMUSCULAR; INTRAVENOUS PRN
Status: DISCONTINUED | OUTPATIENT
Start: 2023-08-17 | End: 2023-08-17 | Stop reason: SDUPTHER

## 2023-08-17 RX ORDER — SODIUM CHLORIDE 0.9 % (FLUSH) 0.9 %
5-40 SYRINGE (ML) INJECTION EVERY 12 HOURS SCHEDULED
Status: DISCONTINUED | OUTPATIENT
Start: 2023-08-17 | End: 2023-08-17

## 2023-08-17 RX ORDER — SODIUM CHLORIDE 9 MG/ML
INJECTION, SOLUTION INTRAVENOUS PRN
Status: DISCONTINUED | OUTPATIENT
Start: 2023-08-17 | End: 2023-08-17

## 2023-08-17 RX ORDER — LIDOCAINE HYDROCHLORIDE 10 MG/ML
INJECTION, SOLUTION INFILTRATION; PERINEURAL PRN
Status: DISCONTINUED | OUTPATIENT
Start: 2023-08-17 | End: 2023-08-17 | Stop reason: SDUPTHER

## 2023-08-17 RX ORDER — ONDANSETRON 2 MG/ML
4 INJECTION INTRAMUSCULAR; INTRAVENOUS
Status: DISCONTINUED | OUTPATIENT
Start: 2023-08-17 | End: 2023-08-17

## 2023-08-17 RX ADMIN — SODIUM CHLORIDE: 9 INJECTION, SOLUTION INTRAVENOUS at 06:34

## 2023-08-17 RX ADMIN — PROCHLORPERAZINE EDISYLATE 10 MG: 5 INJECTION INTRAMUSCULAR; INTRAVENOUS at 00:34

## 2023-08-17 RX ADMIN — DEXAMETHASONE SODIUM PHOSPHATE 10 MG: 10 INJECTION, SOLUTION INTRAMUSCULAR; INTRAVENOUS at 08:17

## 2023-08-17 RX ADMIN — LISINOPRIL 10 MG: 10 TABLET ORAL at 21:14

## 2023-08-17 RX ADMIN — CIPROFLOXACIN 400 MG: 2 INJECTION, SOLUTION INTRAVENOUS at 16:09

## 2023-08-17 RX ADMIN — CEFAZOLIN 2000 MG: 2 INJECTION, POWDER, FOR SOLUTION INTRAMUSCULAR; INTRAVENOUS at 08:04

## 2023-08-17 RX ADMIN — FENTANYL CITRATE 50 MCG: 50 INJECTION, SOLUTION INTRAMUSCULAR; INTRAVENOUS at 08:08

## 2023-08-17 RX ADMIN — MEPERIDINE HYDROCHLORIDE 12.5 MG: 50 INJECTION, SOLUTION INTRAMUSCULAR; INTRAVENOUS; SUBCUTANEOUS at 11:41

## 2023-08-17 RX ADMIN — ONDANSETRON 4 MG: 2 INJECTION INTRAMUSCULAR; INTRAVENOUS at 10:19

## 2023-08-17 RX ADMIN — ESMOLOL HYDROCHLORIDE 10 MG: 10 INJECTION, SOLUTION INTRAVENOUS at 10:23

## 2023-08-17 RX ADMIN — PROPOFOL 150 MG: 10 INJECTION, EMULSION INTRAVENOUS at 08:08

## 2023-08-17 RX ADMIN — Medication 3 MG: at 10:19

## 2023-08-17 RX ADMIN — HYDROMORPHONE HYDROCHLORIDE 0.5 MG: 1 INJECTION, SOLUTION INTRAMUSCULAR; INTRAVENOUS; SUBCUTANEOUS at 12:24

## 2023-08-17 RX ADMIN — LATANOPROST 1 DROP: 50 SOLUTION OPHTHALMIC at 21:15

## 2023-08-17 RX ADMIN — SODIUM CHLORIDE: 9 INJECTION, SOLUTION INTRAVENOUS at 10:28

## 2023-08-17 RX ADMIN — SODIUM CHLORIDE: 9 INJECTION, SOLUTION INTRAVENOUS at 21:19

## 2023-08-17 RX ADMIN — INDOCYANINE GREEN AND WATER 5 MG: KIT at 07:11

## 2023-08-17 RX ADMIN — KETOROLAC TROMETHAMINE 30 MG: 30 INJECTION, SOLUTION INTRAMUSCULAR; INTRAVENOUS at 10:19

## 2023-08-17 RX ADMIN — METRONIDAZOLE 500 MG: 500 INJECTION, SOLUTION INTRAVENOUS at 17:23

## 2023-08-17 RX ADMIN — DESMOPRESSIN ACETATE 20.52 MCG: 4 SOLUTION INTRAVENOUS at 09:00

## 2023-08-17 RX ADMIN — SODIUM CHLORIDE, PRESERVATIVE FREE 10 ML: 5 INJECTION INTRAVENOUS at 21:15

## 2023-08-17 RX ADMIN — ONDANSETRON 4 MG: 2 INJECTION INTRAMUSCULAR; INTRAVENOUS at 07:06

## 2023-08-17 RX ADMIN — GLYCOPYRROLATE 0.4 MG: 0.2 INJECTION INTRAMUSCULAR; INTRAVENOUS at 10:19

## 2023-08-17 RX ADMIN — MEPERIDINE HYDROCHLORIDE 12.5 MG: 50 INJECTION INTRAMUSCULAR; INTRAVENOUS; SUBCUTANEOUS at 11:41

## 2023-08-17 RX ADMIN — Medication 0.5 MG: at 12:24

## 2023-08-17 RX ADMIN — ROCURONIUM BROMIDE 10 MG: 10 INJECTION, SOLUTION INTRAVENOUS at 09:48

## 2023-08-17 RX ADMIN — LIDOCAINE HYDROCHLORIDE 50 MG: 10 INJECTION, SOLUTION INFILTRATION; PERINEURAL at 08:08

## 2023-08-17 RX ADMIN — ROCURONIUM BROMIDE 40 MG: 10 INJECTION, SOLUTION INTRAVENOUS at 08:08

## 2023-08-17 RX ADMIN — ESMOLOL HYDROCHLORIDE 10 MG: 10 INJECTION, SOLUTION INTRAVENOUS at 10:35

## 2023-08-17 RX ADMIN — FENTANYL CITRATE 50 MCG: 50 INJECTION, SOLUTION INTRAMUSCULAR; INTRAVENOUS at 08:38

## 2023-08-17 RX ADMIN — HYDROMORPHONE HYDROCHLORIDE 0.25 MG: 1 INJECTION, SOLUTION INTRAMUSCULAR; INTRAVENOUS; SUBCUTANEOUS at 00:15

## 2023-08-17 ASSESSMENT — PAIN SCALES - GENERAL
PAINLEVEL_OUTOF10: 3
PAINLEVEL_OUTOF10: 4
PAINLEVEL_OUTOF10: 4
PAINLEVEL_OUTOF10: 2
PAINLEVEL_OUTOF10: 2
PAINLEVEL_OUTOF10: 3
PAINLEVEL_OUTOF10: 9
PAINLEVEL_OUTOF10: 3
PAINLEVEL_OUTOF10: 9
PAINLEVEL_OUTOF10: 4
PAINLEVEL_OUTOF10: 2
PAINLEVEL_OUTOF10: 3
PAINLEVEL_OUTOF10: 3
PAINLEVEL_OUTOF10: 5

## 2023-08-17 ASSESSMENT — PAIN DESCRIPTION - LOCATION
LOCATION: ABDOMEN
LOCATION: ABDOMEN;GENERALIZED

## 2023-08-17 ASSESSMENT — PAIN DESCRIPTION - PAIN TYPE
TYPE: SURGICAL PAIN

## 2023-08-17 ASSESSMENT — PAIN - FUNCTIONAL ASSESSMENT: PAIN_FUNCTIONAL_ASSESSMENT: 0-10

## 2023-08-17 ASSESSMENT — PAIN DESCRIPTION - ORIENTATION
ORIENTATION: RIGHT
ORIENTATION: RIGHT

## 2023-08-17 ASSESSMENT — PAIN DESCRIPTION - DESCRIPTORS
DESCRIPTORS: BURNING
DESCRIPTORS: DISCOMFORT;SHARP
DESCRIPTORS: THROBBING
DESCRIPTORS: THROBBING

## 2023-08-17 NOTE — PROGRESS NOTES
Alarm  Required Braces or Orthoses?: No  Position Activity Restriction  Other position/activity restrictions: up with assist, lap cholecystectomy 8/17    Subjective   General  Patient assessed for rehabilitation services?: Yes  Family / Caregiver Present: No  Subjective  Subjective: Patient reports 5/10 abdominal pain . General Comment  Comments: Patient cooperative. Objective              Safety Devices  Type of Devices: Left in chair;Chair alarm in place;Call light within reach;Nurse notified  Restraints  Restraints Initially in Place: No    Balance  Sitting: Intact  Standing: Impaired (static-SBA, dynamic-CGA)    Functional mobility  Overall Level of Assistance: Stand-by assistance;Contact-guard assistance  Interventions: Verbal cues  Assistive Device: Gait belt        ADL  Feeding: Independent  Feeding Skilled Clinical Factors: with drink  LE Dressing: Maximum assistance  LE Dressing Skilled Clinical Factors: willy slipper socks due to lap ollie this date  Toileting Skilled Clinical Factors: declined need at this time        Bed mobility  Rolling to Left: Stand by assistance  Supine to Sit: Contact guard assistance  Scooting: Independent  Bed Mobility Comments: educated on log rolling technique due to lap ollie    Transfers  Sit to stand: Contact guard assistance  Stand to sit: Stand by assistance                        Education Given To: Patient  Education Provided: Transfer Training;Role of Therapy;Plan of Care;ADL Adaptive Strategies; Fall Prevention Strategies  Education Provided Comments: logroll techniques  Education Method: Demonstration;Verbal  Education Outcome: Verbalized understanding;Continued education needed;Demonstrated understanding                     AM-PAC Score        AM-[facility] Inpatient Daily Activity Raw Score: 17 (08/17/23 1559)  AM-PAC Inpatient ADL T-Scale Score : 37.26 (08/17/23 1559)  ADL Inpatient CMS 0-100% Score: 50.11 (08/17/23 1559)  ADL Inpatient CMS G-Code Modifier : KIN

## 2023-08-17 NOTE — PLAN OF CARE
Problem: Discharge Planning  Goal: Discharge to home or other facility with appropriate resources  Outcome: Progressing   Problem: Pain  Goal: Verbalizes/displays adequate comfort level or baseline comfort level  Outcome: Progressing   No new signs/symptoms of pain noted, pain rating < 3 on scale of 0-10, pain controlled with medication/ repositioning   Problem: Safety - Adult  Goal: Free from fall injury  Outcome: Progressing   No falls/ injuries this shift, bed in lowest position, brakes on, bed alarm on, call light in reach, side rails up x2

## 2023-08-17 NOTE — PROGRESS NOTES
>60   CALCIUM 9.1  --  8.0*   CAION  --  1.15  --    PHOS  --  3.3  --    TROPHS 7  --   --      Recent Labs     08/15/23  2205 08/16/23  0534 08/16/23  0534 08/16/23  0850 08/17/23  0541 08/17/23  0547   PROT 6.3*  --   --   --   --  5.4*   LABALBU 3.8  --   --   --   --  3.0*   AST 23  --   --   --   --  25   ALT 13  --   --   --   --  11   ALKPHOS 93  --   --   --   --  68   BILITOT 0.2*  --   --   --   --  0.5   BILIDIR 0.1  --   --   --   --   --    LIPASE 1,080* 648*  --   --   --  22   TRIG  --  40   < > 44  --   --    POCGLU  --   --   --   --  99  --     < > = values in this interval not displayed. Glucose:  Recent Labs     08/17/23  0541   POCGLU 99       ABG:No results found for: POCPH, PHART, PH, POCPCO2, HIR6ORP, PCO2, POCPO2, PO2ART, PO2, POCHCO3, UVZ4FOW, HCO3, NBEA, PBEA, BEART, BE, THGBART, THB, VFI5RIX, JGXK4BJL, K2YLGIMM, O2SAT, FIO2    Radiology:  CT ABDOMEN PELVIS WO CONTRAST Additional Contrast? None    Result Date: 8/15/2023  1. No acute findings in the abdomen or pelvis. 2. Cholelithiasis. 3. Nodularity of the liver suggesting hepatic cirrhosis with no evidence of portal hypertension. 4. Punctate stone in the left kidney and a 3.1 cm simple left renal cyst.  No imaging follow-up is recommended for the cyst.     XR CHEST (2 VW)    Result Date: 8/15/2023  No radiographic evidence of an acute cardiopulmonary process. MRI ABDOMEN WO CONTRAST MRCP    Result Date: 8/16/2023  1. Redemonstration of cholelithiasis with several gallstones noted more apparent on the current exam than the prior CT. There is new finding of significant gallbladder wall edema/thickening and pericholecystic fluid continuous with perihepatic free fluid which also tracks towards the duodenum and also inferiorly along the right flank. Most compatible with acute cholecystitis. 2. No convincing evidence for acute pancreatitis. 3. Cirrhotic liver. No steatosis.   No focal liver lesion noting some decreased sensitivity on this unenhanced study. ASSESSMENT  Gallstone pancreatitis vs acute cholecystitis    Problem List  Patient Active Problem List   Diagnosis    Acute pancreatitis without infection or necrosis       PLAN  -Pain and nausea control PRN  -Monitor vitals per unit standard  -Encourage IS, pulmonary hygiene, and ambulation  -Monitor I/O's  -NPO diet  -Plan for laparoscopic robotic assisted cholecystectomy. This procedure, including risks, benefits, alternatives and complications have been fully reviewed with the patient and informed consent was obtained. Risks discussed include infection, bleeding, injury to surrounding structures, need for more procedures, chronic pain, scarring, risks of anesthesia, including myocardial infarction, stroke, fatal arrhythmias. Patient understands and all questions were answered appropriately. -Remainder of care and disposition per primary team.    Electronically signed by Rm Walker DO on 8/17/2023 at 7:08 AM  General Surgery Resident, PGY-2    Attending Physician Statement  I have discussed the case with Dr Floyd Thibodeaux, including pertinent history and exam findings with the resident. I have seen and examined the patient and the key elements of the encounter have been performed by me. I agree with the assessment, plan and orders as documented by the resident.       Electronically signed by Alison Hernandez IV, DO  on 8/17/2023 at 7:34 AM

## 2023-08-17 NOTE — OP NOTE
Operative Note      Patient: Louisa Grande  YOB: 1940  MRN: [id number]    Date of Procedure: 8/17/2023    Pre-Op Diagnosis Codes:     * Acute cholecystitis [K81.0]    Post-Op Diagnosis:  Gangrenous cholecystitis       Procedure(s):  LAPAROSCOPIC ROBOTIC SUBTOTAL CHOLECYSTECTOMY, drain placement    Surgeon(s):  Odell Chahal IV, DO    Assistant:   Resident: Papito Royal DO; Patrick Newsome DO    Anesthesia: General    Antibiotics: 2 g Ancef    Estimated Blood Loss (mL): 10 mL    Complications: None    Specimens:   ID Type Source Tests Collected by Time Destination   A : GALLBLADDER AND CONTENTS Tissue Gallbladder SURGICAL PATHOLOGY Ash Talamantestom MOORE, DO 8/17/2023 1014        Implants:  * No implants in log *      Drains:   Closed/Suction Drain Medial RUQ Bulb (Active)   Site Description Clean, dry & intact 08/17/23 1020   Dressing Status New dressing applied;Clean, dry & intact 08/17/23 1020       Findings: Wound class III, acute cholecystitis with gangrenous cholecystitis noted, severe periportal edema and inflammation, performance of robotic assisted laparoscopic subtotal cholecystectomy with oversewing of cystic duct's and #19 round channel drain placement. Performance of bilateral direct visualization tap block with 0.5% Marcaine with epinephrine, 20 mL used per side. Indication: Patient is an 81-year-old female with acute gallstone pancreatitis and evidence of acute cholecystitis by imaging. Patient had improvement and abdominal pain with downtrending of lipase this morning, MRCP without evidence choledocholithiasis. Decision made to proceed with robotic assisted laparoscopic cholecystectomy. Due to patient being on 325 mg of aspirin daily the patient was administered 0.3 mcg/kg desmopressin intraoperatively.   Discussion had with the patient regarding risks and complications including bleeding, infection, damage to intra-abdominal contents, injury to common bile duct, risk for mildly inflamed. A ProGrasp was used to elevate the gallbladder fundus allowing visualization of the body and infundibulum. It was at this time we noted that the gallbladder was gangrenous along portions of the posterior aspect and the decision was made to proceed with a subtotal cholecystectomy due to severe edema and inflammation around the cystic duct into the clay. Firefly evaluation was performed noting the path of the common duct well away from the area where would be dissecting. At this time dissection was performed using hook electrocautery to score across the peritoneum and fatty tissues at the infundibulum continuing this dissection towards the liver bed. Once the peritoneum and fatty tissues had been elevated off of the gallbladder we were able to score down from the fundus all the way to the infundibulum in a vertical line followed by transverse scoring to facilitate the creation of flaps of the gallbladder wall. At this point the gallbladder fundus was entered with hook electrocautery and the gallbladder suctioned of its contents with suction  device. We then proceeded to open the gallbladder in stepwise fashion by continuation of the vertical incision and performance of transection in a transverse fashion opening flaps of the gallbladder maintaining visualization of the anterior of the gallbladder as well as the medial and lateral walls as it connects to the gallbladder fossa. Vessel sealer device was used to come across the cystic artery while making the lowest medial flap of the gallbladder wall. At this point we were able to see down into the infundibulum and visualize the cystic os. Suction  device was used to clear any stones and debris from the infundibulum and cystic os. At this point in time we decided to close the cystic os with 3-0 PDS V-Loc suture in continuous running fashion using Devang suture cut needle  in the left hand.   After closure of the cystic

## 2023-08-17 NOTE — ANESTHESIA POSTPROCEDURE EVALUATION
Department of Anesthesiology  Postprocedure Note    Patient: Tim Hutson  MRN: [id number]  YOB: 1940  Date of evaluation: 8/17/2023      Procedure Summary     Date: 08/17/23 Room / Location: [address]    Anesthesia Start: Horris Sheerer Anesthesia Stop: 8530    Procedure: [address] (Abdomen) Diagnosis:       Acute cholecystitis      (Acute cholecystitis [K81.0])    Surgeons: Dave Robison DO Responsible Provider: Dirk Mendosa MD    Anesthesia Type: general ASA Status: 3          Anesthesia Type: No value filed.     Deloris Phase I: Deloris Score: 8    Deloris Phase II:        Anesthesia Post Evaluation    Patient location during evaluation: PACU  Patient participation: complete - patient participated  Level of consciousness: awake and alert  Airway patency: patent  Nausea & Vomiting: no nausea and no vomiting  Complications: no  Cardiovascular status: blood pressure returned to baseline  Respiratory status: acceptable and room air  Hydration status: euvolemic  Pain management: adequate and satisfactory to patient

## 2023-08-18 VITALS
DIASTOLIC BLOOD PRESSURE: 50 MMHG | TEMPERATURE: 98.4 F | SYSTOLIC BLOOD PRESSURE: 128 MMHG | WEIGHT: 160.27 LBS | OXYGEN SATURATION: 100 % | HEIGHT: 65 IN | RESPIRATION RATE: 18 BRPM | HEART RATE: 70 BPM | BODY MASS INDEX: 26.7 KG/M2

## 2023-08-18 LAB
ALBUMIN SERPL-MCNC: 3 G/DL (ref 3.5–5.2)
ALBUMIN/GLOB SERPL: 1.3 {RATIO} (ref 1–2.5)
ALP SERPL-CCNC: 60 U/L (ref 35–104)
ALT SERPL-CCNC: 20 U/L (ref 5–33)
ANION GAP SERPL CALCULATED.3IONS-SCNC: 8 MMOL/L (ref 9–17)
AST SERPL-CCNC: 39 U/L
BASOPHILS # BLD: 0 K/UL (ref 0–0.2)
BASOPHILS NFR BLD: 0 % (ref 0–2)
BILIRUB SERPL-MCNC: 0.5 MG/DL (ref 0.3–1.2)
BUN SERPL-MCNC: 17 MG/DL (ref 8–23)
CALCIUM SERPL-MCNC: 7.6 MG/DL (ref 8.6–10.4)
CHLORIDE SERPL-SCNC: 105 MMOL/L (ref 98–107)
CO2 SERPL-SCNC: 19 MMOL/L (ref 20–31)
CREAT SERPL-MCNC: 0.6 MG/DL (ref 0.5–0.9)
EOSINOPHIL # BLD: 0 K/UL (ref 0–0.4)
EOSINOPHILS RELATIVE PERCENT: 0 % (ref 1–4)
ERYTHROCYTE [DISTWIDTH] IN BLOOD BY AUTOMATED COUNT: 16.5 % (ref 12.5–15.4)
GFR SERPL CREATININE-BSD FRML MDRD: >60 ML/MIN/1.73M2
GLUCOSE SERPL-MCNC: 94 MG/DL (ref 70–99)
HCT VFR BLD AUTO: 31.1 % (ref 36–46)
HGB BLD-MCNC: 10.2 G/DL (ref 12–16)
LYMPHOCYTES NFR BLD: 1 K/UL (ref 1–4.8)
LYMPHOCYTES RELATIVE PERCENT: 8 % (ref 24–44)
MCH RBC QN AUTO: 28.9 PG (ref 26–34)
MCHC RBC AUTO-ENTMCNC: 32.7 G/DL (ref 31–37)
MCV RBC AUTO: 88.4 FL (ref 80–100)
MONOCYTES NFR BLD: 1.1 K/UL (ref 0.1–1.2)
MONOCYTES NFR BLD: 9 % (ref 2–11)
NEUTROPHILS NFR BLD: 83 % (ref 36–66)
NEUTS SEG NFR BLD: 9.8 K/UL (ref 1.8–7.7)
PLATELET # BLD AUTO: 183 K/UL (ref 140–450)
PMV BLD AUTO: 6.7 FL (ref 6–12)
POTASSIUM SERPL-SCNC: 4.1 MMOL/L (ref 3.7–5.3)
PROT SERPL-MCNC: 5.3 G/DL (ref 6.4–8.3)
RBC # BLD AUTO: 3.52 M/UL (ref 4–5.2)
SODIUM SERPL-SCNC: 132 MMOL/L (ref 135–144)
WBC OTHER # BLD: 11.9 K/UL (ref 3.5–11)

## 2023-08-18 PROCEDURE — 6360000002 HC RX W HCPCS: Performed by: STUDENT IN AN ORGANIZED HEALTH CARE EDUCATION/TRAINING PROGRAM

## 2023-08-18 PROCEDURE — 51798 US URINE CAPACITY MEASURE: CPT

## 2023-08-18 PROCEDURE — 2580000003 HC RX 258: Performed by: STUDENT IN AN ORGANIZED HEALTH CARE EDUCATION/TRAINING PROGRAM

## 2023-08-18 PROCEDURE — 36415 COLL VENOUS BLD VENIPUNCTURE: CPT

## 2023-08-18 PROCEDURE — 6360000002 HC RX W HCPCS: Performed by: HOSPITALIST

## 2023-08-18 PROCEDURE — 85025 COMPLETE CBC W/AUTO DIFF WBC: CPT

## 2023-08-18 PROCEDURE — 6370000000 HC RX 637 (ALT 250 FOR IP): Performed by: STUDENT IN AN ORGANIZED HEALTH CARE EDUCATION/TRAINING PROGRAM

## 2023-08-18 PROCEDURE — 51701 INSERT BLADDER CATHETER: CPT

## 2023-08-18 PROCEDURE — 80053 COMPREHEN METABOLIC PANEL: CPT

## 2023-08-18 PROCEDURE — 99232 SBSQ HOSP IP/OBS MODERATE 35: CPT | Performed by: HOSPITALIST

## 2023-08-18 RX ORDER — CIPROFLOXACIN 500 MG/1
500 TABLET, FILM COATED ORAL 2 TIMES DAILY
Qty: 20 TABLET | Refills: 0 | Status: SHIPPED | OUTPATIENT
Start: 2023-08-18 | End: 2023-08-28

## 2023-08-18 RX ORDER — OXYCODONE HYDROCHLORIDE 5 MG/1
5 TABLET ORAL EVERY 6 HOURS PRN
Qty: 20 TABLET | Refills: 0 | Status: SHIPPED | OUTPATIENT
Start: 2023-08-18 | End: 2023-08-23

## 2023-08-18 RX ORDER — FUROSEMIDE 10 MG/ML
20 INJECTION INTRAMUSCULAR; INTRAVENOUS ONCE
Status: COMPLETED | OUTPATIENT
Start: 2023-08-18 | End: 2023-08-18

## 2023-08-18 RX ORDER — METRONIDAZOLE 500 MG/1
500 TABLET ORAL 3 TIMES DAILY
Qty: 30 TABLET | Refills: 0 | Status: SHIPPED | OUTPATIENT
Start: 2023-08-18 | End: 2023-08-28

## 2023-08-18 RX ORDER — DOCUSATE SODIUM 100 MG/1
100 CAPSULE, LIQUID FILLED ORAL 2 TIMES DAILY PRN
Qty: 60 CAPSULE | Refills: 0 | Status: SHIPPED | OUTPATIENT
Start: 2023-08-18

## 2023-08-18 RX ADMIN — SODIUM CHLORIDE, PRESERVATIVE FREE 10 ML: 5 INJECTION INTRAVENOUS at 10:53

## 2023-08-18 RX ADMIN — CIPROFLOXACIN 400 MG: 2 INJECTION, SOLUTION INTRAVENOUS at 03:17

## 2023-08-18 RX ADMIN — METOPROLOL SUCCINATE 50 MG: 50 TABLET, EXTENDED RELEASE ORAL at 07:58

## 2023-08-18 RX ADMIN — FUROSEMIDE 20 MG: 10 INJECTION, SOLUTION INTRAMUSCULAR; INTRAVENOUS at 10:52

## 2023-08-18 RX ADMIN — METRONIDAZOLE 500 MG: 500 INJECTION, SOLUTION INTRAVENOUS at 00:25

## 2023-08-18 RX ADMIN — EZETIMIBE 10 MG: 10 TABLET ORAL at 07:59

## 2023-08-18 RX ADMIN — METRONIDAZOLE 500 MG: 500 INJECTION, SOLUTION INTRAVENOUS at 08:04

## 2023-08-18 RX ADMIN — ENOXAPARIN SODIUM 40 MG: 100 INJECTION SUBCUTANEOUS at 08:00

## 2023-08-18 RX ADMIN — PANTOPRAZOLE SODIUM 40 MG: 40 TABLET, DELAYED RELEASE ORAL at 06:22

## 2023-08-18 ASSESSMENT — PAIN SCALES - GENERAL
PAINLEVEL_OUTOF10: 3
PAINLEVEL_OUTOF10: 2
PAINLEVEL_OUTOF10: 3
PAINLEVEL_OUTOF10: 2

## 2023-08-18 NOTE — PROGRESS NOTES
Patient educated on how to change dressing around ALISHA drain. Reviewed emptying drain and milking tubing. Question answered. Reviewed DC paperwork with pt and mike.  picked up medications from [employer]. Patient taken out to car in a wheelchair. 0

## 2023-08-18 NOTE — DISCHARGE SUMMARY
5.4*  --  5.3*   LABALBU 3.8  --   --   --   --  3.0*  --  3.0*   AST 23  --   --   --   --  25  --  39*   ALT 13  --   --   --   --  11  --  20   ALKPHOS 93  --   --   --   --  68  --  60   BILITOT 0.2*  --   --   --   --  0.5  --  0.5   BILIDIR 0.1  --   --   --   --   --   --   --    LIPASE 1,080* 648*  --   --   --  22  --   --    TRIG  --  40   < > 44  --   --   --   --    POCGLU  --   --   --   --  99  --  167*  --     < > = values in this interval not displayed. ABG:No results found for: POCPH, PHART, PH, POCPCO2, VTQ3SXV, PCO2, POCPO2, PO2ART, PO2, POCHCO3, PHQ6GTM, HCO3, NBEA, PBEA, BEART, BE, THGBART, THB, SGX7OQS, TDDZ6KCQ, V6JRKCSF, O2SAT, FIO2  No results found for: SPECIAL  No results found for: CULTURE    Radiology:  CT ABDOMEN PELVIS WO CONTRAST Additional Contrast? None    Result Date: 8/15/2023  1. No acute findings in the abdomen or pelvis. 2. Cholelithiasis. 3. Nodularity of the liver suggesting hepatic cirrhosis with no evidence of portal hypertension. 4. Punctate stone in the left kidney and a 3.1 cm simple left renal cyst.  No imaging follow-up is recommended for the cyst.     XR CHEST (2 VW)    Result Date: 8/15/2023  No radiographic evidence of an acute cardiopulmonary process. MRI ABDOMEN WO CONTRAST MRCP    Result Date: 8/16/2023  1. Redemonstration of cholelithiasis with several gallstones noted more apparent on the current exam than the prior CT. There is new finding of significant gallbladder wall edema/thickening and pericholecystic fluid continuous with perihepatic free fluid which also tracks towards the duodenum and also inferiorly along the right flank. Most compatible with acute cholecystitis. 2. No convincing evidence for acute pancreatitis. 3. Cirrhotic liver. No steatosis. No focal liver lesion noting some decreased sensitivity on this unenhanced study.        Consultations:    Consults:     Final Specialist Recommendations/Findings:   IP CONSULT TO GENERAL SURGERY The patient was seen and examined on day of discharge and this discharge summary is in conjunction with any daily progress note from day of discharge. Discharge plan:     Disposition: Home    Physician Follow Up:   Marija Hernandez IV, DO  [facility] [address]  [phone]    Follow up in 1 week(s)         Requiring Further Evaluation/Follow Up POST HOSPITALIZATION/Incidental Findings: None    Diet: regular diet    Activity: As tolerated    Instructions to Patient: None    Discharge Medications:      Medication List        START taking these medications      ciprofloxacin 500 MG tablet  Commonly known as: CIPRO  Take 1 tablet by mouth 2 times daily for 10 days     docusate sodium 100 MG capsule  Commonly known as: COLACE  Take 1 capsule by mouth 2 times daily as needed for Constipation     metroNIDAZOLE 500 MG tablet  Commonly known as: FLAGYL  Take 1 tablet by mouth 3 times daily for 10 days     oxyCODONE 5 MG immediate release tablet  Commonly known as: Roxicodone  Take 1 tablet by mouth every 6 hours as needed for Pain for up to 5 days. Intended supply: 3 days.  Take lowest dose possible to manage pain Max Daily Amount: 20 mg            CONTINUE taking these medications      ALPRAZolam 0.25 MG tablet  Commonly known as: XANAX     aspirin 325 MG tablet     bimatoprost 0.01 % Soln ophthalmic drops  Commonly known as: LUMIGAN     diphenhydrAMINE 25 MG capsule  Commonly known as: BENADRYL     estradiol 0.1 MG/GM vaginal cream  Commonly known as: ESTRACE     ezetimibe 10 MG tablet  Commonly known as: ZETIA     lisinopril 10 MG tablet  Commonly known as: PRINIVIL;ZESTRIL     metFORMIN 500 MG tablet  Commonly known as: GLUCOPHAGE     metoprolol succinate 50 MG extended release tablet  Commonly known as: TOPROL XL     omeprazole 20 MG delayed release capsule  Commonly known as: PRILOSEC               Where to Get Your Medications        These medications were sent to [facility]

## 2023-08-18 NOTE — PROGRESS NOTES
Patient and  educated on how to milk ALISHA tubing, empty drain and compress ALISHA drain how to compress. Questions answered. Container provided x2  to .

## 2023-08-18 NOTE — PLAN OF CARE
Problem: Discharge Planning  Goal: Discharge to home or other facility with appropriate resources  8/18/2023 0423 by Merrick Osler, RN  Outcome: Progressing   Problem: Chronic Conditions and Co-morbidities  Goal: Patient's chronic conditions and co-morbidity symptoms are monitored and maintained or improved  8/18/2023 0423 by Merrick Osler, RN  Outcome: Progressing   Problem: Pain  Goal: Verbalizes/displays adequate comfort level or baseline comfort level  8/18/2023 0423 by Merrick Osler, RN  Outcome: Progressing   No new signs/symptoms of pain noted, pain rating < 3 on scale of 0-10, pain controlled with medication/ repositioning   Problem: Safety - Adult  Goal: Free from fall injury  8/18/2023 0423 by Merrick Osler, RN  Outcome: Progressing   No falls/ injuries this shift, bed in lowest position, brakes on, bed alarm on, call light in reach, side rails up x2

## 2023-08-18 NOTE — PROGRESS NOTES
Physical Therapy        Physical Therapy Cancel Note      DATE: 2023    NAME: Harris Verduzco  MRN: [id number]   : 1940  Chief Complaint   Patient presents with    Abdominal Pain     Upper quadrants abdominal pains that started about 1 hour ago. Onset after eating dinner tonight. Patient not seen this date for Physical Therapy due to:    Patient independent with functional mobility. Will defer PT evaluation at this time. Please reorder PT if future needs arise.    Nursing alerted to patient's choice to not have PT evaluation this AM.      Electronically signed by Tosin Laughlin PT on 2023 at 8:38 AM

## 2023-08-18 NOTE — PLAN OF CARE
Problem: Discharge Planning  Goal: Discharge to home or other facility with appropriate resources  8/18/2023 1351 by Alexander Santillan RN  Outcome: Progressing  8/18/2023 0423 by Joseph Rodriguez RN  Outcome: Progressing     Problem: Pain  Goal: Verbalizes/displays adequate comfort level or baseline comfort level  8/18/2023 1351 by Alexander Santillan RN  Outcome: Progressing  8/18/2023 0423 by Joseph Rodriguez RN  Outcome: Progressing     Problem: ABCDS Injury Assessment  Goal: Absence of physical injury  8/18/2023 0423 by Joseph Rodriguez RN  Outcome: Progressing     Problem: Safety - Adult  Goal: Free from fall injury  8/18/2023 0423 by Joseph Rodriguez RN  Outcome: Progressing     Problem: Chronic Conditions and Co-morbidities  Goal: Patient's chronic conditions and co-morbidity symptoms are monitored and maintained or improved  8/18/2023 0423 by Joseph Rodriguez RN  Outcome: Progressing     Problem: Chronic Conditions and Co-morbidities  Goal: Patient's chronic conditions and co-morbidity symptoms are monitored and maintained or improved  8/18/2023 0423 by Joseph Rodriguez RN  Outcome: Progressing

## 2023-08-19 LAB — SURGICAL PATHOLOGY REPORT: NORMAL

## 2023-08-22 LAB
EKG ATRIAL RATE: 51 BPM
EKG P AXIS: 64 DEGREES
EKG P-R INTERVAL: 200 MS
EKG Q-T INTERVAL: 460 MS
EKG QRS DURATION: 76 MS
EKG QTC CALCULATION (BAZETT): 423 MS
EKG R AXIS: 67 DEGREES
EKG T AXIS: 51 DEGREES
EKG VENTRICULAR RATE: 51 BPM

## 2024-01-07 ENCOUNTER — HOSPITAL ENCOUNTER (INPATIENT)
Age: 84
LOS: 2 days | Discharge: HOME | DRG: 690 | End: 2024-01-09
Attending: EMERGENCY MEDICINE | Admitting: FAMILY MEDICINE
Payer: MEDICARE

## 2024-01-07 DIAGNOSIS — N39.0 URINARY TRACT INFECTION WITHOUT HEMATURIA, SITE UNSPECIFIED: Primary | ICD-10-CM

## 2024-01-07 PROBLEM — H40.9 GLAUCOMA: Status: ACTIVE | Noted: 2023-12-06

## 2024-01-07 PROBLEM — N30.00 ACUTE CYSTITIS WITHOUT HEMATURIA: Status: ACTIVE | Noted: 2024-01-07

## 2024-01-07 PROBLEM — I25.10 CORONARY ARTERY DISEASE INVOLVING NATIVE CORONARY ARTERY OF NATIVE HEART WITHOUT ANGINA PECTORIS: Status: ACTIVE | Noted: 2024-01-07

## 2024-01-07 PROBLEM — E11.9 TYPE 2 DIABETES MELLITUS WITHOUT COMPLICATION (HCC): Status: ACTIVE | Noted: 2023-12-06

## 2024-01-07 PROBLEM — Z87.440 HISTORY OF RECURRENT UTI (URINARY TRACT INFECTION): Status: ACTIVE | Noted: 2024-01-07

## 2024-01-07 PROBLEM — Z87.891 FORMER SMOKER: Status: ACTIVE | Noted: 2024-01-07

## 2024-01-07 PROBLEM — R39.9 SYMPTOMS INVOLVING URINARY SYSTEM: Status: ACTIVE | Noted: 2024-01-07

## 2024-01-07 PROBLEM — L40.9 PSORIASIS: Status: ACTIVE | Noted: 2024-01-07

## 2024-01-07 PROBLEM — K75.81 NASH (NONALCOHOLIC STEATOHEPATITIS): Status: ACTIVE | Noted: 2024-01-07

## 2024-01-07 LAB
ANION GAP SERPL CALCULATED.3IONS-SCNC: 7 MMOL/L (ref 9–17)
BASOPHILS # BLD: 0 K/UL (ref 0–0.2)
BASOPHILS NFR BLD: 1 % (ref 0–2)
BUN SERPL-MCNC: 6 MG/DL (ref 8–23)
CALCIUM SERPL-MCNC: 8.9 MG/DL (ref 8.6–10.4)
CHLORIDE SERPL-SCNC: 99 MMOL/L (ref 98–107)
CO2 SERPL-SCNC: 23 MMOL/L (ref 20–31)
CREAT SERPL-MCNC: 0.6 MG/DL (ref 0.5–0.9)
EOSINOPHIL # BLD: 0.1 K/UL (ref 0–0.4)
EOSINOPHILS RELATIVE PERCENT: 2 % (ref 1–4)
ERYTHROCYTE [DISTWIDTH] IN BLOOD BY AUTOMATED COUNT: 17.3 % (ref 12.5–15.4)
GFR SERPL CREATININE-BSD FRML MDRD: >60 ML/MIN/1.73M2
GLUCOSE BLD-MCNC: 147 MG/DL (ref 65–105)
GLUCOSE BLD-MCNC: 67 MG/DL (ref 65–105)
GLUCOSE SERPL-MCNC: 93 MG/DL (ref 70–99)
HCT VFR BLD AUTO: 36.5 % (ref 36–46)
HGB BLD-MCNC: 12.1 G/DL (ref 12–16)
LYMPHOCYTES NFR BLD: 1.8 K/UL (ref 1–4.8)
LYMPHOCYTES RELATIVE PERCENT: 28 % (ref 24–44)
MCH RBC QN AUTO: 27.6 PG (ref 26–34)
MCHC RBC AUTO-ENTMCNC: 33.3 G/DL (ref 31–37)
MCV RBC AUTO: 83 FL (ref 80–100)
MONOCYTES NFR BLD: 0.8 K/UL (ref 0.1–1.2)
MONOCYTES NFR BLD: 12 % (ref 2–11)
NEUTROPHILS NFR BLD: 57 % (ref 36–66)
NEUTS SEG NFR BLD: 3.8 K/UL (ref 1.8–7.7)
PLATELET # BLD AUTO: 262 K/UL (ref 140–450)
PMV BLD AUTO: 6.6 FL (ref 6–12)
POTASSIUM SERPL-SCNC: 4.3 MMOL/L (ref 3.7–5.3)
RBC # BLD AUTO: 4.39 M/UL (ref 4–5.2)
SODIUM SERPL-SCNC: 129 MMOL/L (ref 135–144)
WBC OTHER # BLD: 6.6 K/UL (ref 3.5–11)

## 2024-01-07 PROCEDURE — 99222 1ST HOSP IP/OBS MODERATE 55: CPT | Performed by: SPECIALIST

## 2024-01-07 PROCEDURE — 82947 ASSAY GLUCOSE BLOOD QUANT: CPT

## 2024-01-07 PROCEDURE — 2580000003 HC RX 258: Performed by: EMERGENCY MEDICINE

## 2024-01-07 PROCEDURE — 2580000003 HC RX 258

## 2024-01-07 PROCEDURE — 99285 EMERGENCY DEPT VISIT HI MDM: CPT

## 2024-01-07 PROCEDURE — 80048 BASIC METABOLIC PNL TOTAL CA: CPT

## 2024-01-07 PROCEDURE — 6360000002 HC RX W HCPCS

## 2024-01-07 PROCEDURE — 6360000002 HC RX W HCPCS: Performed by: EMERGENCY MEDICINE

## 2024-01-07 PROCEDURE — 51701 INSERT BLADDER CATHETER: CPT

## 2024-01-07 PROCEDURE — 85025 COMPLETE CBC W/AUTO DIFF WBC: CPT

## 2024-01-07 PROCEDURE — 1200000000 HC SEMI PRIVATE

## 2024-01-07 PROCEDURE — 6370000000 HC RX 637 (ALT 250 FOR IP)

## 2024-01-07 PROCEDURE — 87086 URINE CULTURE/COLONY COUNT: CPT

## 2024-01-07 PROCEDURE — 51798 US URINE CAPACITY MEASURE: CPT

## 2024-01-07 PROCEDURE — 36415 COLL VENOUS BLD VENIPUNCTURE: CPT

## 2024-01-07 RX ORDER — ESTRADIOL 0.1 MG/G
1 CREAM VAGINAL
Status: DISCONTINUED | OUTPATIENT
Start: 2024-01-07 | End: 2024-01-09 | Stop reason: HOSPADM

## 2024-01-07 RX ORDER — ENOXAPARIN SODIUM 100 MG/ML
40 INJECTION SUBCUTANEOUS DAILY
Status: DISCONTINUED | OUTPATIENT
Start: 2024-01-07 | End: 2024-01-07

## 2024-01-07 RX ORDER — METOPROLOL SUCCINATE 50 MG/1
50 TABLET, EXTENDED RELEASE ORAL DAILY
Status: DISCONTINUED | OUTPATIENT
Start: 2024-01-08 | End: 2024-01-09 | Stop reason: HOSPADM

## 2024-01-07 RX ORDER — SODIUM CHLORIDE 9 MG/ML
INJECTION, SOLUTION INTRAVENOUS PRN
Status: DISCONTINUED | OUTPATIENT
Start: 2024-01-07 | End: 2024-01-09 | Stop reason: HOSPADM

## 2024-01-07 RX ORDER — POTASSIUM CHLORIDE 20 MEQ/1
40 TABLET, EXTENDED RELEASE ORAL PRN
Status: DISCONTINUED | OUTPATIENT
Start: 2024-01-07 | End: 2024-01-09 | Stop reason: HOSPADM

## 2024-01-07 RX ORDER — DIPHENHYDRAMINE HCL 25 MG
25 TABLET ORAL EVERY 6 HOURS PRN
Status: DISCONTINUED | OUTPATIENT
Start: 2024-01-07 | End: 2024-01-09 | Stop reason: HOSPADM

## 2024-01-07 RX ORDER — ACETAMINOPHEN 650 MG/1
650 SUPPOSITORY RECTAL EVERY 6 HOURS PRN
Status: DISCONTINUED | OUTPATIENT
Start: 2024-01-07 | End: 2024-01-09 | Stop reason: HOSPADM

## 2024-01-07 RX ORDER — SODIUM CHLORIDE 0.9 % (FLUSH) 0.9 %
5-40 SYRINGE (ML) INJECTION PRN
Status: DISCONTINUED | OUTPATIENT
Start: 2024-01-07 | End: 2024-01-09 | Stop reason: HOSPADM

## 2024-01-07 RX ORDER — HYDRALAZINE HYDROCHLORIDE 20 MG/ML
5 INJECTION INTRAMUSCULAR; INTRAVENOUS EVERY 6 HOURS PRN
Status: DISCONTINUED | OUTPATIENT
Start: 2024-01-07 | End: 2024-01-09 | Stop reason: HOSPADM

## 2024-01-07 RX ORDER — HEPARIN SODIUM 5000 [USP'U]/ML
5000 INJECTION, SOLUTION INTRAVENOUS; SUBCUTANEOUS EVERY 8 HOURS SCHEDULED
Status: DISCONTINUED | OUTPATIENT
Start: 2024-01-07 | End: 2024-01-09 | Stop reason: HOSPADM

## 2024-01-07 RX ORDER — ONDANSETRON 4 MG/1
4 TABLET, ORALLY DISINTEGRATING ORAL EVERY 8 HOURS PRN
Status: DISCONTINUED | OUTPATIENT
Start: 2024-01-07 | End: 2024-01-09 | Stop reason: HOSPADM

## 2024-01-07 RX ORDER — LATANOPROST 50 UG/ML
1 SOLUTION/ DROPS OPHTHALMIC NIGHTLY
Status: DISCONTINUED | OUTPATIENT
Start: 2024-01-07 | End: 2024-01-09 | Stop reason: HOSPADM

## 2024-01-07 RX ORDER — POTASSIUM CHLORIDE 7.45 MG/ML
10 INJECTION INTRAVENOUS PRN
Status: DISCONTINUED | OUTPATIENT
Start: 2024-01-07 | End: 2024-01-09 | Stop reason: HOSPADM

## 2024-01-07 RX ORDER — ACETAMINOPHEN 325 MG/1
650 TABLET ORAL EVERY 6 HOURS PRN
Status: DISCONTINUED | OUTPATIENT
Start: 2024-01-07 | End: 2024-01-09 | Stop reason: HOSPADM

## 2024-01-07 RX ORDER — EZETIMIBE 10 MG/1
10 TABLET ORAL DAILY
Status: DISCONTINUED | OUTPATIENT
Start: 2024-01-08 | End: 2024-01-09 | Stop reason: HOSPADM

## 2024-01-07 RX ORDER — MAGNESIUM SULFATE IN WATER 40 MG/ML
2000 INJECTION, SOLUTION INTRAVENOUS PRN
Status: DISCONTINUED | OUTPATIENT
Start: 2024-01-07 | End: 2024-01-09 | Stop reason: HOSPADM

## 2024-01-07 RX ORDER — SODIUM CHLORIDE 0.9 % (FLUSH) 0.9 %
5-40 SYRINGE (ML) INJECTION EVERY 12 HOURS SCHEDULED
Status: DISCONTINUED | OUTPATIENT
Start: 2024-01-07 | End: 2024-01-09 | Stop reason: HOSPADM

## 2024-01-07 RX ORDER — POLYETHYLENE GLYCOL 3350 17 G/17G
17 POWDER, FOR SOLUTION ORAL DAILY PRN
Status: DISCONTINUED | OUTPATIENT
Start: 2024-01-07 | End: 2024-01-09 | Stop reason: HOSPADM

## 2024-01-07 RX ORDER — LISINOPRIL 10 MG/1
10 TABLET ORAL
Status: DISCONTINUED | OUTPATIENT
Start: 2024-01-07 | End: 2024-01-09 | Stop reason: HOSPADM

## 2024-01-07 RX ORDER — PANTOPRAZOLE SODIUM 40 MG/1
40 TABLET, DELAYED RELEASE ORAL
Status: DISCONTINUED | OUTPATIENT
Start: 2024-01-08 | End: 2024-01-09 | Stop reason: HOSPADM

## 2024-01-07 RX ORDER — ALPRAZOLAM 0.25 MG/1
0.25 TABLET ORAL NIGHTLY PRN
Status: DISCONTINUED | OUTPATIENT
Start: 2024-01-07 | End: 2024-01-09 | Stop reason: HOSPADM

## 2024-01-07 RX ORDER — ASPIRIN 81 MG/1
162 TABLET ORAL DAILY
Status: DISCONTINUED | OUTPATIENT
Start: 2024-01-08 | End: 2024-01-09 | Stop reason: HOSPADM

## 2024-01-07 RX ORDER — ONDANSETRON 2 MG/ML
4 INJECTION INTRAMUSCULAR; INTRAVENOUS EVERY 6 HOURS PRN
Status: DISCONTINUED | OUTPATIENT
Start: 2024-01-07 | End: 2024-01-09 | Stop reason: HOSPADM

## 2024-01-07 RX ADMIN — ALPRAZOLAM 0.25 MG: 0.25 TABLET ORAL at 23:29

## 2024-01-07 RX ADMIN — LISINOPRIL 10 MG: 10 TABLET ORAL at 21:06

## 2024-01-07 RX ADMIN — DIPHENHYDRAMINE HYDROCHLORIDE 25 MG: 25 TABLET ORAL at 23:29

## 2024-01-07 RX ADMIN — ENOXAPARIN SODIUM 40 MG: 100 INJECTION SUBCUTANEOUS at 15:36

## 2024-01-07 RX ADMIN — LATANOPROST 1 DROP: 50 SOLUTION OPHTHALMIC at 21:05

## 2024-01-07 RX ADMIN — ESTRADIOL 1 G: 0.1 CREAM VAGINAL at 21:32

## 2024-01-07 RX ADMIN — HEPARIN SODIUM 5000 UNITS: 5000 INJECTION INTRAVENOUS; SUBCUTANEOUS at 21:40

## 2024-01-07 RX ADMIN — SODIUM CHLORIDE, PRESERVATIVE FREE 10 ML: 5 INJECTION INTRAVENOUS at 21:11

## 2024-01-07 RX ADMIN — VANCOMYCIN HYDROCHLORIDE 1000 MG: 1 INJECTION, POWDER, LYOPHILIZED, FOR SOLUTION INTRAVENOUS at 14:26

## 2024-01-07 ASSESSMENT — ENCOUNTER SYMPTOMS
COLOR CHANGE: 1
NAUSEA: 0
SORE THROAT: 0
RHINORRHEA: 0
COUGH: 0
DIARRHEA: 0
BACK PAIN: 0
ABDOMINAL DISTENTION: 0
VOMITING: 0
CHEST TIGHTNESS: 0
ABDOMINAL PAIN: 0
SHORTNESS OF BREATH: 0
EYE PAIN: 0
CONSTIPATION: 0

## 2024-01-07 ASSESSMENT — PAIN - FUNCTIONAL ASSESSMENT: PAIN_FUNCTIONAL_ASSESSMENT: NONE - DENIES PAIN

## 2024-01-07 NOTE — ED PROVIDER NOTES
magnesium sulfate 2000 mg in 50 mL IVPB premix    DISCONTD: enoxaparin (LOVENOX) injection 40 mg     Order Specific Question:   Indication of Use     Answer:   Prophylaxis-DVT/PE    OR Linked Order Group     ondansetron (ZOFRAN-ODT) disintegrating tablet 4 mg     ondansetron (ZOFRAN) injection 4 mg    polyethylene glycol (GLYCOLAX) packet 17 g    OR Linked Order Group     acetaminophen (TYLENOL) tablet 650 mg     acetaminophen (TYLENOL) suppository 650 mg    ALPRAZolam (XANAX) tablet 0.25 mg    aspirin EC tablet 162 mg    diphenhydrAMINE (BENADRYL) tablet 25 mg    latanoprost (XALATAN) 0.005 % ophthalmic solution 1 drop    pantoprazole (PROTONIX) tablet 40 mg    metoprolol succinate (TOPROL XL) extended release tablet 50 mg    lisinopril (PRINIVIL;ZESTRIL) tablet 10 mg    estradiol (ESTRACE) vaginal cream 1 g    ezetimibe (ZETIA) tablet 10 mg    DISCONTD: vancomycin (VANCOCIN) 750 mg in sodium chloride 0.9 % 250 mL IVPB ([id number])     Order Specific Question:   Antimicrobial Indications     Answer:   Urinary Tract Infection     Order Specific Question:   UTI duration of therapy     Answer:   5 days     Order Specific Question:   Suspected Organism(s)     Answer:   Staphylococcus coagulase negative    heparin (porcine) injection 5,000 Units    hydrALAZINE (APRESOLINE) injection 5 mg    vancomycin (VANCOCIN) 750 mg in sodium chloride 0.9 % 250 mL IVPB ([id number])     Order Specific Question:   Antimicrobial Indications     Answer:   Urinary Tract Infection     Order Specific Question:   UTI duration of therapy     Answer:   5 days     Order Specific Question:   Suspected Organism(s)     Answer:   Staphylococcus coagulase negative    vancomycin (VANCOCIN) intermittent dosing (placeholder)     Order Specific Question:   Antimicrobial Indications     Answer:   Urinary Tract Infection     Order Specific Question:   UTI duration of therapy     Answer:   5 days        Vitals:    Vitals:    01/07/24 2041 01/07/24 2220

## 2024-01-08 PROBLEM — N39.0 URINARY TRACT INFECTION WITHOUT HEMATURIA: Status: ACTIVE | Noted: 2024-01-08

## 2024-01-08 PROBLEM — K85.90 ACUTE PANCREATITIS WITHOUT INFECTION OR NECROSIS: Status: RESOLVED | Noted: 2023-08-15 | Resolved: 2024-01-08

## 2024-01-08 PROBLEM — B95.7 COAGULASE-NEGATIVE STAPHYLOCOCCAL INFECTION: Status: ACTIVE | Noted: 2024-01-08

## 2024-01-08 LAB
ALBUMIN SERPL-MCNC: 3.2 G/DL (ref 3.5–5.2)
ALBUMIN/GLOB SERPL: 1.2 {RATIO} (ref 1–2.5)
ALP SERPL-CCNC: 123 U/L (ref 35–104)
ALT SERPL-CCNC: 17 U/L (ref 5–33)
ANION GAP SERPL CALCULATED.3IONS-SCNC: 8 MMOL/L (ref 9–17)
AST SERPL-CCNC: 28 U/L
BILIRUB SERPL-MCNC: 0.4 MG/DL (ref 0.3–1.2)
BUN SERPL-MCNC: 6 MG/DL (ref 8–23)
CALCIUM SERPL-MCNC: 8.7 MG/DL (ref 8.6–10.4)
CHLORIDE SERPL-SCNC: 104 MMOL/L (ref 98–107)
CO2 SERPL-SCNC: 23 MMOL/L (ref 20–31)
CREAT SERPL-MCNC: 0.5 MG/DL (ref 0.5–0.9)
GFR SERPL CREATININE-BSD FRML MDRD: >60 ML/MIN/1.73M2
GLUCOSE BLD-MCNC: 103 MG/DL (ref 65–105)
GLUCOSE BLD-MCNC: 134 MG/DL (ref 65–105)
GLUCOSE BLD-MCNC: 88 MG/DL (ref 65–105)
GLUCOSE SERPL-MCNC: 87 MG/DL (ref 70–99)
MICROORGANISM SPEC CULT: NORMAL
POTASSIUM SERPL-SCNC: 4.1 MMOL/L (ref 3.7–5.3)
PROT SERPL-MCNC: 5.8 G/DL (ref 6.4–8.3)
SODIUM SERPL-SCNC: 135 MMOL/L (ref 135–144)
SPECIMEN DESCRIPTION: NORMAL

## 2024-01-08 PROCEDURE — 6360000002 HC RX W HCPCS

## 2024-01-08 PROCEDURE — 99223 1ST HOSP IP/OBS HIGH 75: CPT | Performed by: INTERNAL MEDICINE

## 2024-01-08 PROCEDURE — 1200000000 HC SEMI PRIVATE

## 2024-01-08 PROCEDURE — 6370000000 HC RX 637 (ALT 250 FOR IP)

## 2024-01-08 PROCEDURE — 82947 ASSAY GLUCOSE BLOOD QUANT: CPT

## 2024-01-08 PROCEDURE — 2580000003 HC RX 258

## 2024-01-08 PROCEDURE — 80053 COMPREHEN METABOLIC PANEL: CPT

## 2024-01-08 PROCEDURE — 99223 1ST HOSP IP/OBS HIGH 75: CPT | Performed by: FAMILY MEDICINE

## 2024-01-08 PROCEDURE — 36415 COLL VENOUS BLD VENIPUNCTURE: CPT

## 2024-01-08 PROCEDURE — 51798 US URINE CAPACITY MEASURE: CPT

## 2024-01-08 RX ORDER — TAMSULOSIN HYDROCHLORIDE 0.4 MG/1
0.4 CAPSULE ORAL DAILY
Status: DISCONTINUED | OUTPATIENT
Start: 2024-01-08 | End: 2024-01-09 | Stop reason: HOSPADM

## 2024-01-08 RX ADMIN — HEPARIN SODIUM 5000 UNITS: 5000 INJECTION INTRAVENOUS; SUBCUTANEOUS at 05:31

## 2024-01-08 RX ADMIN — HEPARIN SODIUM 5000 UNITS: 5000 INJECTION INTRAVENOUS; SUBCUTANEOUS at 22:05

## 2024-01-08 RX ADMIN — TAMSULOSIN HYDROCHLORIDE 0.4 MG: 0.4 CAPSULE ORAL at 17:33

## 2024-01-08 RX ADMIN — METOPROLOL SUCCINATE 50 MG: 50 TABLET, EXTENDED RELEASE ORAL at 08:46

## 2024-01-08 RX ADMIN — ASPIRIN 162 MG: 81 TABLET, COATED ORAL at 08:46

## 2024-01-08 RX ADMIN — SODIUM CHLORIDE, PRESERVATIVE FREE 10 ML: 5 INJECTION INTRAVENOUS at 19:38

## 2024-01-08 RX ADMIN — LISINOPRIL 10 MG: 10 TABLET ORAL at 19:37

## 2024-01-08 RX ADMIN — PANTOPRAZOLE SODIUM 40 MG: 40 TABLET, DELAYED RELEASE ORAL at 08:46

## 2024-01-08 RX ADMIN — VANCOMYCIN HYDROCHLORIDE 750 MG: 750 INJECTION, POWDER, LYOPHILIZED, FOR SOLUTION INTRAVENOUS at 14:31

## 2024-01-08 RX ADMIN — ALPRAZOLAM 0.25 MG: 0.25 TABLET ORAL at 22:10

## 2024-01-08 RX ADMIN — SODIUM CHLORIDE, PRESERVATIVE FREE 10 ML: 5 INJECTION INTRAVENOUS at 08:46

## 2024-01-08 RX ADMIN — EZETIMIBE 10 MG: 10 TABLET ORAL at 08:46

## 2024-01-08 RX ADMIN — VANCOMYCIN HYDROCHLORIDE 750 MG: 750 INJECTION, POWDER, LYOPHILIZED, FOR SOLUTION INTRAVENOUS at 01:44

## 2024-01-08 RX ADMIN — LATANOPROST 1 DROP: 50 SOLUTION OPHTHALMIC at 19:37

## 2024-01-08 NOTE — PLAN OF CARE
Problem: Discharge Planning  Goal: Discharge to home or other facility with appropriate resources  Outcome: Progressing  Flowsheets (Taken 1/8/2024 1502)  Discharge to home or other facility with appropriate resources:   Identify barriers to discharge with patient and caregiver   Arrange for needed discharge resources and transportation as appropriate   Identify discharge learning needs (meds, wound care, etc)   Refer to discharge planning if patient needs post-hospital services based on physician order or complex needs related to functional status, cognitive ability or social support system     Problem: Chronic Conditions and Co-morbidities  Goal: Patient's chronic conditions and co-morbidity symptoms are monitored and maintained or improved  Outcome: Progressing  Flowsheets (Taken 1/8/2024 1502)  Care Plan - Patient's Chronic Conditions and Co-Morbidity Symptoms are Monitored and Maintained or Improved:   Monitor and assess patient's chronic conditions and comorbid symptoms for stability, deterioration, or improvement   Collaborate with multidisciplinary team to address chronic and comorbid conditions and prevent exacerbation or deterioration   Update acute care plan with appropriate goals if chronic or comorbid symptoms are exacerbated and prevent overall improvement and discharge     Problem: Safety - Adult  Goal: Free from fall injury  Outcome: Progressing  Flowsheets (Taken 1/8/2024 1502)  Free From Fall Injury:   Instruct family/caregiver on patient safety   Based on caregiver fall risk screen, instruct family/caregiver to ask for assistance with transferring infant if caregiver noted to have fall risk factors

## 2024-01-08 NOTE — DISCHARGE INSTRUCTIONS
Date of admission: 1/7/2024  Date of discharge: 01/08/24    Your care was provided by the attending and resident physicians of the [facility]. The encounter diagnosis was Urinary tract infection without hematuria, site unspecified.    FOLLOW-UP  - Follow up with your primary care physician as soon as possible for hospital follow up.  - Follow up with urology -- Dr. Eddie Gallardo in 2-3 weeks.  Call his office to schedule a follow up appointment.    MEDICATIONS  -  your medications from the pharmacy and take as directed.  - Continue taking your other home medications as directed.    ADDITIONAL INSTRUCTIONS  - We are sending you home with tamsulosin (Flomax) 0.4 mg capsule, which is for urinary retention.  Hopefully this will help.  - Stop taking the Benadryl every night, as it may be contributing to your urinary retention.  - Please return immediately to the [facility] Emergency Department if your symptoms return or worsen.

## 2024-01-09 VITALS
HEART RATE: 56 BPM | BODY MASS INDEX: 23.52 KG/M2 | SYSTOLIC BLOOD PRESSURE: 118 MMHG | TEMPERATURE: 98.1 F | HEIGHT: 64 IN | RESPIRATION RATE: 18 BRPM | WEIGHT: 137.79 LBS | DIASTOLIC BLOOD PRESSURE: 58 MMHG | OXYGEN SATURATION: 99 %

## 2024-01-09 PROBLEM — N30.00 ACUTE CYSTITIS WITHOUT HEMATURIA: Status: RESOLVED | Noted: 2024-01-07 | Resolved: 2024-01-09

## 2024-01-09 PROBLEM — N39.0 URINARY TRACT INFECTION WITHOUT HEMATURIA: Status: RESOLVED | Noted: 2024-01-08 | Resolved: 2024-01-09

## 2024-01-09 PROBLEM — B95.7 COAGULASE-NEGATIVE STAPHYLOCOCCAL INFECTION: Status: RESOLVED | Noted: 2024-01-08 | Resolved: 2024-01-09

## 2024-01-09 LAB
ANION GAP SERPL CALCULATED.3IONS-SCNC: 8 MMOL/L (ref 9–17)
BASOPHILS # BLD: 0 K/UL (ref 0–0.2)
BASOPHILS NFR BLD: 1 % (ref 0–2)
BUN SERPL-MCNC: 8 MG/DL (ref 8–23)
CALCIUM SERPL-MCNC: 8.6 MG/DL (ref 8.6–10.4)
CHLORIDE SERPL-SCNC: 106 MMOL/L (ref 98–107)
CO2 SERPL-SCNC: 22 MMOL/L (ref 20–31)
CREAT SERPL-MCNC: 0.5 MG/DL (ref 0.5–0.9)
EOSINOPHIL # BLD: 0.1 K/UL (ref 0–0.4)
EOSINOPHILS RELATIVE PERCENT: 2 % (ref 1–4)
ERYTHROCYTE [DISTWIDTH] IN BLOOD BY AUTOMATED COUNT: 16.7 % (ref 12.5–15.4)
GFR SERPL CREATININE-BSD FRML MDRD: >60 ML/MIN/1.73M2
GLUCOSE BLD-MCNC: 108 MG/DL (ref 65–105)
GLUCOSE BLD-MCNC: 114 MG/DL (ref 65–105)
GLUCOSE SERPL-MCNC: 124 MG/DL (ref 70–99)
HCT VFR BLD AUTO: 31.4 % (ref 36–46)
HGB BLD-MCNC: 10.7 G/DL (ref 12–16)
LYMPHOCYTES NFR BLD: 1.7 K/UL (ref 1–4.8)
LYMPHOCYTES RELATIVE PERCENT: 34 % (ref 24–44)
MCH RBC QN AUTO: 27.9 PG (ref 26–34)
MCHC RBC AUTO-ENTMCNC: 34.1 G/DL (ref 31–37)
MCV RBC AUTO: 81.9 FL (ref 80–100)
MONOCYTES NFR BLD: 0.5 K/UL (ref 0.1–1.2)
MONOCYTES NFR BLD: 10 % (ref 2–11)
NEUTROPHILS NFR BLD: 53 % (ref 36–66)
NEUTS SEG NFR BLD: 2.7 K/UL (ref 1.8–7.7)
PLATELET # BLD AUTO: 226 K/UL (ref 140–450)
PMV BLD AUTO: 6.2 FL (ref 6–12)
POTASSIUM SERPL-SCNC: 4.2 MMOL/L (ref 3.7–5.3)
RBC # BLD AUTO: 3.83 M/UL (ref 4–5.2)
SODIUM SERPL-SCNC: 136 MMOL/L (ref 135–144)
VANCOMYCIN TROUGH SERPL-MCNC: 8.8 UG/ML (ref 10–20)
WBC OTHER # BLD: 5.1 K/UL (ref 3.5–11)

## 2024-01-09 PROCEDURE — 99232 SBSQ HOSP IP/OBS MODERATE 35: CPT | Performed by: NURSE PRACTITIONER

## 2024-01-09 PROCEDURE — 6360000002 HC RX W HCPCS

## 2024-01-09 PROCEDURE — 85025 COMPLETE CBC W/AUTO DIFF WBC: CPT

## 2024-01-09 PROCEDURE — 36415 COLL VENOUS BLD VENIPUNCTURE: CPT

## 2024-01-09 PROCEDURE — 6370000000 HC RX 637 (ALT 250 FOR IP)

## 2024-01-09 PROCEDURE — 2580000003 HC RX 258

## 2024-01-09 PROCEDURE — 80202 ASSAY OF VANCOMYCIN: CPT

## 2024-01-09 PROCEDURE — 80048 BASIC METABOLIC PNL TOTAL CA: CPT

## 2024-01-09 PROCEDURE — 99238 HOSP IP/OBS DSCHRG MGMT 30/<: CPT | Performed by: FAMILY MEDICINE

## 2024-01-09 PROCEDURE — 51798 US URINE CAPACITY MEASURE: CPT

## 2024-01-09 PROCEDURE — 82947 ASSAY GLUCOSE BLOOD QUANT: CPT

## 2024-01-09 RX ORDER — TAMSULOSIN HYDROCHLORIDE 0.4 MG/1
0.4 CAPSULE ORAL DAILY
Qty: 30 CAPSULE | Refills: 3 | Status: SHIPPED | OUTPATIENT
Start: 2024-01-10 | End: 2024-01-09

## 2024-01-09 RX ORDER — TAMSULOSIN HYDROCHLORIDE 0.4 MG/1
0.4 CAPSULE ORAL DAILY
Qty: 30 CAPSULE | Refills: 0 | Status: SHIPPED | OUTPATIENT
Start: 2024-01-10

## 2024-01-09 RX ADMIN — METOPROLOL SUCCINATE 50 MG: 50 TABLET, EXTENDED RELEASE ORAL at 09:11

## 2024-01-09 RX ADMIN — ASPIRIN 162 MG: 81 TABLET, COATED ORAL at 09:11

## 2024-01-09 RX ADMIN — VANCOMYCIN HYDROCHLORIDE 750 MG: 750 INJECTION, POWDER, LYOPHILIZED, FOR SOLUTION INTRAVENOUS at 14:06

## 2024-01-09 RX ADMIN — TAMSULOSIN HYDROCHLORIDE 0.4 MG: 0.4 CAPSULE ORAL at 09:11

## 2024-01-09 RX ADMIN — ACETAMINOPHEN 650 MG: 325 TABLET ORAL at 02:00

## 2024-01-09 RX ADMIN — VANCOMYCIN HYDROCHLORIDE 750 MG: 750 INJECTION, POWDER, LYOPHILIZED, FOR SOLUTION INTRAVENOUS at 01:59

## 2024-01-09 RX ADMIN — PANTOPRAZOLE SODIUM 40 MG: 40 TABLET, DELAYED RELEASE ORAL at 09:10

## 2024-01-09 RX ADMIN — SODIUM CHLORIDE, PRESERVATIVE FREE 10 ML: 5 INJECTION INTRAVENOUS at 09:11

## 2024-01-09 RX ADMIN — EZETIMIBE 10 MG: 10 TABLET ORAL at 09:11

## 2024-01-09 ASSESSMENT — ENCOUNTER SYMPTOMS
GASTROINTESTINAL NEGATIVE: 1
RESPIRATORY NEGATIVE: 1

## 2024-01-09 ASSESSMENT — PAIN SCALES - GENERAL
PAINLEVEL_OUTOF10: 3
PAINLEVEL_OUTOF10: 0

## 2024-01-09 ASSESSMENT — PAIN DESCRIPTION - LOCATION: LOCATION: HEAD

## 2024-01-09 NOTE — CARE COORDINATION
Case Management Assessment  Initial Evaluation    Date/Time of Evaluation: 1/9/2024 4:09 PM  Assessment Completed by: Mary Pelletier RN    If patient is discharged prior to next notation, then this note serves as note for discharge by case management.    Patient Name: Junior Soto                   YOB: 1940  Diagnosis: Urinary tract infection without hematuria, site unspecified [N39.0]                   Date / Time: 1/7/2024  1:26 PM    Patient Admission Status: Inpatient   Readmission Risk (Low < 19, Mod (19-27), High > 27): Readmission Risk Score: 12.5    Current PCP: No primary care provider on file.  PCP verified by CM? Yes    Chart Reviewed: Yes      History Provided by: Patient  Patient Orientation: Alert and Oriented    Patient Cognition: Alert    Hospitalization in the last 30 days (Readmission):  No    If yes, Readmission Assessment in CM Navigator will be completed.    Advance Directives:      Code Status: Full Code   Patient's Primary Decision Maker is:        Discharge Planning:    Patient lives with: Spouse/Significant Other Type of Home: House  Primary Care Giver: Self  Patient Support Systems include: Spouse/Significant Other, Family Members   Current Financial resources: Medicare  Current community resources:    Current services prior to admission: None            Current DME:              Type of Home Care services:  None    ADLS  Prior functional level: Assistance with the following:, Housework  Current functional level: Assistance with the following:, Housework    PT AM-PAC:   /24  OT AM-PAC:   /24    Family can provide assistance at DC: Yes  Would you like Case Management to discuss the discharge plan with any other family members/significant others, and if so, who? No  Plans to Return to Present Housing: Yes  Other Identified Issues/Barriers to RETURNING to current housing: none  Potential Assistance needed at discharge: N/A            Potential DME:    Patient expects to

## 2024-01-09 NOTE — CONSULTS
[facility]  Eddie Gallardo MD [facility]    Urology Consultation    Patient:  Junior Soto  MRN: [id number]  YOB: 1940  Consult requested from Kenia Ulloa MD for purpose of evaluation of recurrent UTI's.    CHIEF COMPLAINT:  recurrent UTI's    HISTORY OF PRESENT ILLNESS:   The patient is a 83 y.o. female who presents with:  Recurrent UTI's:  Patient is here today for Recurrent UTI's which started several year(s) ago.   The last infection was  a few days day(s) ago.  During this infection, the patient has experienced the following symptoms: nocturia x 2, cloudy urine and mild dysuria  Fever and chills? Chills but no fever  Related to sexually intercourse? No  Previous urine C&S results: coagulase negative Staph  Current Rx for recurrent UTI's: none  Symptoms improve with treatment: yes  Lower urinary tract symptoms: frequency       From Mary Osborne MD H&P  83-year-old female with past medical history of type 2 diabetes mellitus, hypertension, GARCIA, recurrent UTIs, glaucoma, hyperlipidemia, and anxiety who presented to the ED with complains chills, urinary frequency and positive urine culture obtained at Urgent Care four days ago.   Patient has a long standing history of recurrent UTIs. Her present episode of cystitis began on Tuesday (1/2/24), when she noted nonspecific UTI symptoms - generalized fatigue, chills, and nocturia (which is atypical for her).  Patient went to urgent care on Thursday (1/4/24), concerned that she was developing a UTI.  At the urgent care, patient received a workup for UTI, which included UA, urine culture, CBC, CMP.  Patient was sent home with a course of oral ciprofloxacin 250 mg twice daily x 3 days. This morning, the urgent care facility called her to inform her that the urine culture came back positive for staph with susceptibility only to vancomycin and advised her to go to the ED for IV antibiotics.       Patient's old records, notes and chart 
  Resulting Agency [facility]   Susceptibility    Organism Antibiotic Method Susceptibility   Staphylococcus, Coagulase Negative Cefazolin KWAN METHOD RESISTANT(DEDUCED)   Staphylococcus, Coagulase Negative Oxacillin KWAN METHOD >=4: Resistant   Staphylococcus, Coagulase Negative TRIMETH/SULFAMETHOXAZOLE KWAN METHOD 4/76: Resistant   Staphylococcus, Coagulase Negative Vancomycin KWAN METHOD 2: Susceptible   Staphylococcus, Coagulase Negative Doxycycline KWAN METHOD >=16: Resistant    Comment: CLIA ID [id number]   Specimen Collected: 01/04/24 14:41    Performed by: Broota Last Resulted: 01/06/24 13:14   Received From: [facility]  Result Received: 01/07/24 14:07       Urine Culture  Specimen: Urine - Urine specimen collection, clean catch (procedure)  Component 3 wk ago   Culture >100,000 ORGANISMS/mL KLEBSIELLA PNEUMONIAE Abnormal    Resulting Agency [facility]   Susceptibility    Organism Antibiotic Method Susceptibility   Klebsiella Pneumoniae Ampicillin KWAN METHOD 16: Resistant   Klebsiella Pneumoniae AMP/SULBACTAM KWAN METHOD <=2/1: Susceptible   Klebsiella Pneumoniae Cefazolin KWAN METHOD <=4: Susceptible   Klebsiella Pneumoniae Ceftriaxone KWAN METHOD <=1: Susceptible   Klebsiella Pneumoniae Ciprofloxacin KWAN METHOD <=0.25: Susceptible   Klebsiella Pneumoniae Gentamicin KWAN METHOD <=1: Susceptible   Klebsiella Pneumoniae Levofloxacin KWAN METHOD <=0.12: Susceptible   Klebsiella Pneumoniae Nitrofurantoin KWAN METHOD 64: Intermediate   Klebsiella Pneumoniae PIPERACIL/TAZOBACTAM KWAN METHOD <=4: Susceptible   Klebsiella Pneumoniae Tobramycin KWAN METHOD <=1: Susceptible   Klebsiella Pneumoniae TRIMETH/SULFAMETHOXAZOLE KWAN METHOD <=1/19: Susceptible   Specimen Collected: 12/14/23 16:02    Performed by: Broota Last Resulted: 12/16/23 16:11   Received From: [facility]  Result Received: 01/07/24 13:41         Urine culture (clean catch)  Specimen: Urine - Urine

## 2024-01-09 NOTE — PLAN OF CARE
Problem: Discharge Planning  Goal: Discharge to home or other facility with appropriate resources  1/8/2024 2226 by Natalia Ortiz RN  Outcome: Progressing  1/8/2024 2226 by Natalia Ortiz RN  Outcome: Progressing  Flowsheets (Taken 1/8/2024 1958)  Discharge to home or other facility with appropriate resources: Identify barriers to discharge with patient and caregiver  1/8/2024 1502 by Mendy Grant RN  Outcome: Progressing  Flowsheets (Taken 1/8/2024 1502)  Discharge to home or other facility with appropriate resources:   Identify barriers to discharge with patient and caregiver   Arrange for needed discharge resources and transportation as appropriate   Identify discharge learning needs (meds, wound care, etc)   Refer to discharge planning if patient needs post-hospital services based on physician order or complex needs related to functional status, cognitive ability or social support system     Problem: Chronic Conditions and Co-morbidities  Goal: Patient's chronic conditions and co-morbidity symptoms are monitored and maintained or improved  1/8/2024 2226 by Natalia Ortiz RN  Outcome: Progressing  1/8/2024 2226 by Natalia Ortiz RN  Outcome: Progressing  Flowsheets (Taken 1/8/2024 1958)  Care Plan - Patient's Chronic Conditions and Co-Morbidity Symptoms are Monitored and Maintained or Improved: Monitor and assess patient's chronic conditions and comorbid symptoms for stability, deterioration, or improvement  1/8/2024 1502 by Mendy Grant RN  Outcome: Progressing  Flowsheets (Taken 1/8/2024 1502)  Care Plan - Patient's Chronic Conditions and Co-Morbidity Symptoms are Monitored and Maintained or Improved:   Monitor and assess patient's chronic conditions and comorbid symptoms for stability, deterioration, or improvement   Collaborate with multidisciplinary team to address chronic and comorbid conditions and prevent exacerbation or deterioration   Update acute care plan with appropriate goals if chronic or

## 2024-01-09 NOTE — PROGRESS NOTES
[facility]  Inpatient Service     Progress Note  1/9/2024    7:57 AM    Name:   Junior Soto  MRN:     [id number]     Acct:      [id number]   Room:   [facility]318-01   Day:  2  Admit Date:  1/7/2024  1:26 PM    PCP:   No primary care provider on file.  Code Status:  Full Code    Subjective:     Overnight events: No acute events overnight.     Pt was examined at bedside and states that she is feeling fine this morning, but is starting to feel little congested.  Patient is tired from being woken up every couple hours and is ready to go home. She denies any pelvic or abdominal pain, dysuria, hematuria, cloudy urine, fevers or chills.      Medications:     Allergies:    Allergies   Allergen Reactions    Iodine      States she has a shellfish allergy    Penicillins     Statins        Current Meds:   Scheduled Meds:    tamsulosin  0.4 mg Oral Daily    sodium chloride flush  5-40 mL IntraVENous 2 times per day    aspirin  162 mg Oral Daily    latanoprost  1 drop Both Eyes Nightly    pantoprazole  40 mg Oral QAM AC    metoprolol succinate  50 mg Oral Daily    lisinopril  10 mg Oral QHS    estradiol  1 g Vaginal Q3 Days    ezetimibe  10 mg Oral Daily    heparin (porcine)  5,000 Units SubCUTAneous 3 times per day    vancomycin  750 mg IntraVENous Q12H    vancomycin (VANCOCIN) intermittent dosing (placeholder)   Other RX Placeholder     Continuous Infusions:    sodium chloride       PRN Meds: sodium chloride flush, sodium chloride, potassium chloride **OR** potassium alternative oral replacement **OR** potassium chloride, magnesium sulfate, ondansetron **OR** ondansetron, polyethylene glycol, acetaminophen **OR** acetaminophen, ALPRAZolam, [Held by provider] diphenhydrAMINE, hydrALAZINE    ROS:   ROS is negative except for points noted above.    Data:     Vitals:  BP (!) 121/58   Pulse 72   Temp 98 °F (36.7 °C) (Oral)   Resp 18   Ht 1.626 m (5' 4\")   Wt 62.5 kg 
  [facility]  Progress Note    Junior Soto  MRN: [id number]  Date: 1/9/2024  LOS: 2     Reason for F/U :   Recurrent urinary tract infection    Impression :   Recurrent urinary tract infection related to incomplete bladder emptying  Coagulase-negative Staphylococcus urinary tract infection  Diabetes mellitus type 2  Nonalcoholic steatohepatitis    Recommendations:   The patient continues on IV vancomycin and is asymptomatic, today is day 3/3 of treatment  After completion of IV and microbial therapy today, the patient is okay for discharge from an infectious disease standpoint    Infection Control Recommendations:   Universal precautions    Discharge Planning:   Estimated Length of IV antimicrobials: 1/9/2024  Patient will need Midline Catheter Insertion/ PICC line Insertion: No  Patient will need: Home IV , Infusion Center,  SNF,  LTAC: Undetermined  Patient willneed outpatient wound care: No    Medical Decision making / Summary of Stay:   Junior Soto is a 83 y.o.-year-old female who was initially admitted on 1/7/2024.   Junior has type 2 diabetes mellitus, hypertension, GARCIA, recurrent UTIs, glaucoma, hyperlipidemia, and anxiety.     Junior is seen and evaluated at the request of the primary team and has a longstanding history of bladder issues.  The patient tells me that she had bladder prolapse and ended up having surgery and reports the bladder being stable to her pelvis.  The patient has had urinary retention/incomplete bladder emptying is on Flomax and due to all these issues has had recurrent urinary tract infections for which she reports in the last few years that these have been getting worse.     The patient reports that she had started having some dysuria and nocturia ended up going to [facility] urgent care center to be evaluated for urinary tract infection.  She had testing done ended up coming back with coagulase-negative staph which was showing a resistant pattern and the 
[facility]   Pharmacy Pharmacokinetic Monitoring Service - Vancomycin     Junior Soto is a 83 y.o. female starting on vancomycin therapy for UTI. Pharmacy consulted by Dr. Motta for monitoring and adjustment.    Target Concentration: Goal trough of 10-15 mg/L and AUC/KWAN <500 mg*hr/L      Pertinent Laboratory Values:   Wt Readings from Last 1 Encounters:   01/07/24 65.3 kg (144 lb)     Temp Readings from Last 1 Encounters:   01/07/24 97.7 °F (36.5 °C) (Oral)     Estimated Creatinine Clearance: 62 mL/min (based on SCr of 0.6 mg/dL).  Recent Labs     01/07/24  1420   CREATININE 0.6   BUN 6*   WBC 6.6     Pertinent Cultures: Coagulase-negative Staphyloccous >100k CFU from outside facility, sensitive to vancomycin.    Plan:  Dosing recommendations based on Bayesian software  Start vancomycin 750 mg IV q12h  Anticipated AUC of 493 and trough concentration of 14.5 at steady state  Renal labs as indicated   Pharmacy will continue to monitor patient and adjust therapy as indicated    Thank you for the consult,  Yony Maher, Pharm.D., WALDO, BCCCP  1/7/2024 5:35 PM          
[facility]   Pharmacy Pharmacokinetic Monitoring Service - Vancomycin    Consulting Provider: Dr Motta   Indication: UTI  Target Concentration: Goal AUC/KWAN 400-600 mg*hr/L  Day of Therapy: 3  Additional Antimicrobials: none    Pertinent Laboratory Values:   Wt Readings from Last 1 Encounters:   01/07/24 62.5 kg (137 lb 12.6 oz)     Temp Readings from Last 1 Encounters:   01/09/24 98.1 °F (36.7 °C) (Oral)     Estimated Creatinine Clearance: 75 mL/min (based on SCr of 0.5 mg/dL).  Recent Labs     01/07/24  1420 01/08/24  0611 01/09/24  0627   CREATININE 0.6 0.5 0.5   BUN 6* 6* 8   WBC 6.6  --  5.1     Procalcitonin:     Pertinent Cultures:  Culture Date Source Results   1/7/24 urine No significant growth   MRSA Nasal Swab: N/A. Non-respiratory infection.    Recent vancomycin administrations                     vancomycin (VANCOCIN) 750 mg in sodium chloride 0.9 % 250 mL IVPB ([id number]) (mg) 750 mg New Bag 01/09/24 1406     750 mg New Bag  0159     750 mg New Bag 01/08/24 1431     750 mg New Bag  0144    vancomycin (VANCOCIN) 1,000 mg in sodium chloride 0.9 % 250 mL IVPB ([id number]) (mg) 1,000 mg New Bag 01/07/24 1426                    Assessment:  Date/Time Current Dose Concentration Timing of Concentration (h) AUC   1/9/24 750 mg every 12 hours 8.8 11 hours post dose 406   Note: Serum concentrations collected for AUC dosing may appear elevated if collected in close proximity to the dose administered, this is not necessarily an indication of toxicity    Plan:  Current dosing regimen is therapeutic, expect drug to accumulate over next several days, will closely monitor  Continue current dose  Repeat vancomycin concentration ordered for 1/11/24 @ 1300   Pharmacy will continue to monitor patient and adjust therapy as indicated    Thank you for the consult,  GERRY REDDY [facility]  1/9/2024 2:11 PM    
murmur is present with a grade of 2/6.   Pulmonary:      Effort: Pulmonary effort is normal. No respiratory distress.      Breath sounds: Normal breath sounds.   Abdominal:      General: Abdomen is flat. Bowel sounds are normal. There is no distension.      Palpations: Abdomen is soft.      Tenderness: There is no abdominal tenderness.      Comments: No suprapubic tenderness or pain   Musculoskeletal:      Right lower leg: No edema.      Left lower leg: No edema.   Skin:     General: Skin is warm and dry.      Capillary Refill: Capillary refill takes less than 2 seconds.   Neurological:      General: No focal deficit present.      Mental Status: She is alert and oriented to person, place, and time. Mental status is at baseline.     Osteopathic Exam:   No dysfunction noted.  Examination limited by hospital bed.    Assessment:     Hospital Problems             Last Modified POA    * (Principal) Acute cystitis without hematuria 1/7/2024 Yes    Anxiety 1/7/2024 Yes    Gastroesophageal reflux disease 1/7/2024 Yes    Glaucoma 1/7/2024 Yes    Hyperlipidemia 1/7/2024 Yes    Hypertensive kidney disease without chronic kidney disease 1/7/2024 Yes    Symptoms involving urinary system 1/7/2024 Yes    Type 2 diabetes mellitus without complication, without long-term current use of insulin (HCC) 1/7/2024 Yes    GARCIA (nonalcoholic steatohepatitis) 1/7/2024 Yes    Former smoker 1/7/2024 Yes    Overview Signed 1/7/2024  3:46 PM by Eligio Motta DO     1/2 PPD x 10 years         Coronary artery disease involving native coronary artery of native heart without angina pectoris 1/7/2024 Yes    History of recurrent UTI (urinary tract infection) 1/7/2024 Yes     Junior Soto is an 83-year-old female with past medical history of type 2 diabetes mellitus, hypertension, GARCIA, recurrent UTIs, glaucoma, hyperlipidemia, and anxiety who presented to the ED with complains chills, urinary frequency and positive urine culture obtained at Urgent

## 2024-01-09 NOTE — PLAN OF CARE
Problem: Discharge Planning  Goal: Discharge to home or other facility with appropriate resources  Outcome: Completed     Problem: Chronic Conditions and Co-morbidities  Goal: Patient's chronic conditions and co-morbidity symptoms are monitored and maintained or improved  Outcome: Completed     Problem: Safety - Adult  Goal: Free from fall injury  Outcome: Completed

## 2024-03-21 ENCOUNTER — TELEPHONE (OUTPATIENT)
Dept: UROLOGY | Age: 84
End: 2024-03-21

## 2024-03-21 NOTE — TELEPHONE ENCOUNTER
Patient called to reschedule her appointment with Dr Gallardo in April. She is asking if there is anyway she could follow up with a Urologist in [place] due to the location. She states nothing against Dr Gallardo, she is 84 years old and concerned about the location.    She is asking for a return call to discuss.    Thank you.

## 2024-04-25 ENCOUNTER — HOSPITAL ENCOUNTER (OUTPATIENT)
Age: 84
Setting detail: SPECIMEN
Discharge: HOME | End: 2024-04-25

## 2024-04-25 ENCOUNTER — OFFICE VISIT (OUTPATIENT)
Dept: UROLOGY | Age: 84
End: 2024-04-25
Payer: MEDICARE

## 2024-04-25 VITALS
SYSTOLIC BLOOD PRESSURE: 154 MMHG | DIASTOLIC BLOOD PRESSURE: 70 MMHG | HEIGHT: 64 IN | BODY MASS INDEX: 24.75 KG/M2 | WEIGHT: 145 LBS | HEART RATE: 60 BPM

## 2024-04-25 DIAGNOSIS — N39.0 RECURRENT UTI: ICD-10-CM

## 2024-04-25 DIAGNOSIS — R33.8 OTHER RETENTION OF URINE: Primary | ICD-10-CM

## 2024-04-25 PROBLEM — N30.01 ACUTE CYSTITIS WITH HEMATURIA: Status: ACTIVE | Noted: 2024-04-25

## 2024-04-25 LAB
APPEARANCE FLUID: ABNORMAL
BILIRUBIN, POC: NEGATIVE
BLOOD URINE, POC: ABNORMAL
CLARITY, POC: CLEAR
COLOR, POC: YELLOW
GLUCOSE URINE, POC: NEGATIVE
KETONES, POC: NEGATIVE
LEUKOCYTE EST, POC: ABNORMAL
NITRITE, POC: NEGATIVE
PH, POC: 6.5
POST VOID RESIDUAL (PVR): 333 ML
PROTEIN, POC: NEGATIVE
SPECIFIC GRAVITY, POC: 1.02
UROBILINOGEN, POC: 0.2

## 2024-04-25 PROCEDURE — 1123F ACP DISCUSS/DSCN MKR DOCD: CPT | Performed by: SPECIALIST

## 2024-04-25 PROCEDURE — 3077F SYST BP >= 140 MM HG: CPT | Performed by: SPECIALIST

## 2024-04-25 PROCEDURE — 99214 OFFICE O/P EST MOD 30 MIN: CPT | Performed by: SPECIALIST

## 2024-04-25 PROCEDURE — 3078F DIAST BP <80 MM HG: CPT | Performed by: SPECIALIST

## 2024-04-25 PROCEDURE — G8420 CALC BMI NORM PARAMETERS: HCPCS | Performed by: SPECIALIST

## 2024-04-25 PROCEDURE — 1036F TOBACCO NON-USER: CPT | Performed by: SPECIALIST

## 2024-04-25 PROCEDURE — 81002 URINALYSIS NONAUTO W/O SCOPE: CPT | Performed by: SPECIALIST

## 2024-04-25 PROCEDURE — 51798 US URINE CAPACITY MEASURE: CPT | Performed by: SPECIALIST

## 2024-04-25 PROCEDURE — G8400 PT W/DXA NO RESULTS DOC: HCPCS | Performed by: SPECIALIST

## 2024-04-25 PROCEDURE — G8427 DOCREV CUR MEDS BY ELIG CLIN: HCPCS | Performed by: SPECIALIST

## 2024-04-25 PROCEDURE — 1090F PRES/ABSN URINE INCON ASSESS: CPT | Performed by: SPECIALIST

## 2024-04-25 RX ORDER — TAMSULOSIN HYDROCHLORIDE 0.4 MG/1
0.4 CAPSULE ORAL DAILY
Qty: 30 CAPSULE | Refills: 5 | Status: SHIPPED | OUTPATIENT
Start: 2024-04-25

## 2024-04-25 NOTE — PROGRESS NOTES
Last BUN and creatinine:  Lab Results   Component Value Date    BUN 8 01/09/2024     Lab Results   Component Value Date    CREATININE 0.5 01/09/2024       Last CBC:  Lab Results   Component Value Date    WBC 5.1 01/09/2024    HGB 10.7 (L) 01/09/2024    HCT 31.4 (L) 01/09/2024    MCV 81.9 01/09/2024     01/09/2024       Additional Lab/Culture results: 4/25/24 C&S negative    Imaging Reviewed during this Office Visit: none  (results were independently reviewed by physician and radiology report verified)    PAST MEDICAL, FAMILY AND SOCIAL HISTORY:  Past Medical History:   Diagnosis Date    Acute pancreatitis without infection or necrosis 8/15/2023    Anxiety     CAD (coronary artery disease)     CKD (chronic kidney disease)     Diabetes mellitus (HCC)     Gastroesophageal reflux disease     Glaucoma     Hepatic cirrhosis (HCC)     Hyperlipidemia     Hypertension     Recurrent UTI      Past Surgical History:   Procedure Laterality Date    APPENDECTOMY      CARDIAC CATHETERIZATION      CHOLECYSTECTOMY, LAPAROSCOPIC  08/17/2023    LAPAROSCOPIC ROBOTIC CHOLECYSTECTOMY    CHOLECYSTECTOMY, LAPAROSCOPIC N/A 8/17/2023    LAPAROSCOPIC ROBOTIC CHOLECYSTECTOMY performed by Johnathan Hernandez IV, DO at [facility] OR    COLONOSCOPY      HYSTERECTOMY (CERVIX STATUS UNKNOWN)       No family history on file.  Current Outpatient Medications   Medication Sig Dispense Refill    tamsulosin (FLOMAX) 0.4 MG capsule Take 1 capsule by mouth daily 30 capsule 5    Aspirin 81 MG CAPS Take 162 mg by mouth daily      ALPRAZolam (XANAX) 0.25 MG tablet Take 1 tablet by mouth nightly as needed for Sleep. Pt takes 1.5 tablets nightly before bed      bimatoprost (LUMIGAN) 0.01 % SOLN ophthalmic drops Place 1 drop into both eyes nightly      estradiol (ESTRACE) 0.1 MG/GM vaginal cream Place 2 g vaginally every 3 days      ezetimibe (ZETIA) 10 MG tablet Take 1 tablet by mouth daily      lisinopril (PRINIVIL;ZESTRIL) 10 MG tablet Take 1

## 2024-04-26 LAB
MICROORGANISM SPEC CULT: NORMAL
SPECIMEN DESCRIPTION: NORMAL

## 2024-04-29 DIAGNOSIS — N39.0 RECURRENT UTI: Primary | ICD-10-CM

## 2024-04-29 DIAGNOSIS — R33.8 OTHER RETENTION OF URINE: ICD-10-CM

## 2024-06-07 ENCOUNTER — HOSPITAL ENCOUNTER (OUTPATIENT)
Age: 84
Setting detail: SPECIMEN
Discharge: HOME | End: 2024-06-07

## 2024-06-07 DIAGNOSIS — N39.0 RECURRENT UTI: ICD-10-CM

## 2024-06-08 LAB
MICROORGANISM SPEC CULT: ABNORMAL
SERVICE CMNT-IMP: ABNORMAL
SPECIMEN DESCRIPTION: ABNORMAL

## 2024-06-10 DIAGNOSIS — N39.0 RECURRENT UTI: Primary | ICD-10-CM

## 2024-06-10 RX ORDER — SULFAMETHOXAZOLE AND TRIMETHOPRIM 800; 160 MG/1; MG/1
1 TABLET ORAL 2 TIMES DAILY
Qty: 20 TABLET | Refills: 0 | Status: SHIPPED | OUTPATIENT
Start: 2024-06-10

## 2024-06-18 ENCOUNTER — HOSPITAL ENCOUNTER (OUTPATIENT)
Age: 84
Setting detail: SPECIMEN
Discharge: HOME | End: 2024-06-18
Payer: MEDICARE

## 2024-06-18 DIAGNOSIS — N39.0 RECURRENT UTI: ICD-10-CM

## 2024-06-18 PROCEDURE — 87086 URINE CULTURE/COLONY COUNT: CPT

## 2024-06-18 PROCEDURE — 87186 SC STD MICRODIL/AGAR DIL: CPT

## 2024-06-18 PROCEDURE — 87077 CULTURE AEROBIC IDENTIFY: CPT

## 2024-06-20 ENCOUNTER — HOSPITAL ENCOUNTER (INPATIENT)
Age: 84
LOS: 5 days | Discharge: NURSING, REHAB OR LONG-TERM CARE | DRG: 690 | End: 2024-06-25
Attending: EMERGENCY MEDICINE | Admitting: STUDENT IN AN ORGANIZED HEALTH CARE EDUCATION/TRAINING PROGRAM
Payer: MEDICARE

## 2024-06-20 ENCOUNTER — APPOINTMENT (OUTPATIENT)
Dept: GENERAL RADIOLOGY | Age: 84
DRG: 690 | End: 2024-06-20
Payer: MEDICARE

## 2024-06-20 DIAGNOSIS — R06.2 WHEEZING: ICD-10-CM

## 2024-06-20 DIAGNOSIS — N39.0 URINARY TRACT INFECTION WITHOUT HEMATURIA, SITE UNSPECIFIED: Primary | ICD-10-CM

## 2024-06-20 DIAGNOSIS — A49.8 KLEBSIELLA INFECT: Primary | ICD-10-CM

## 2024-06-20 DIAGNOSIS — E87.1 HYPONATREMIA: ICD-10-CM

## 2024-06-20 PROBLEM — B96.89 URINARY TRACT INFECTION DUE TO ESBL KLEBSIELLA: Status: ACTIVE | Noted: 2024-06-20

## 2024-06-20 LAB
ANION GAP SERPL CALCULATED.3IONS-SCNC: 9 MMOL/L (ref 9–17)
BACTERIA URNS QL MICRO: ABNORMAL
BASOPHILS # BLD: 0 K/UL (ref 0–0.2)
BASOPHILS NFR BLD: 0 % (ref 0–2)
BILIRUB UR QL STRIP: NEGATIVE
BUN SERPL-MCNC: 17 MG/DL (ref 8–23)
CALCIUM SERPL-MCNC: 9.1 MG/DL (ref 8.6–10.4)
CHARACTER UR: ABNORMAL
CHLORIDE SERPL-SCNC: 93 MMOL/L (ref 98–107)
CLARITY UR: ABNORMAL
CO2 SERPL-SCNC: 19 MMOL/L (ref 20–31)
COLOR UR: YELLOW
CREAT SERPL-MCNC: 0.9 MG/DL (ref 0.5–0.9)
EOSINOPHIL # BLD: 0.1 K/UL (ref 0–0.4)
EOSINOPHILS RELATIVE PERCENT: 1 % (ref 1–4)
EPI CELLS #/AREA URNS HPF: ABNORMAL /HPF (ref 0–5)
ERYTHROCYTE [DISTWIDTH] IN BLOOD BY AUTOMATED COUNT: 16.5 % (ref 12.5–15.4)
GFR, ESTIMATED: 63 ML/MIN/1.73M2
GLUCOSE SERPL-MCNC: 102 MG/DL (ref 70–99)
GLUCOSE UR STRIP-MCNC: NEGATIVE MG/DL
HCT VFR BLD AUTO: 31.2 % (ref 36–46)
HGB BLD-MCNC: 10.2 G/DL (ref 12–16)
HGB UR QL STRIP.AUTO: ABNORMAL
KETONES UR STRIP-MCNC: NEGATIVE MG/DL
LEUKOCYTE ESTERASE UR QL STRIP: ABNORMAL
LYMPHOCYTES NFR BLD: 2.1 K/UL (ref 1–4.8)
LYMPHOCYTES RELATIVE PERCENT: 30 % (ref 24–44)
MAGNESIUM SERPL-MCNC: 2 MG/DL (ref 1.6–2.6)
MCH RBC QN AUTO: 25.5 PG (ref 26–34)
MCHC RBC AUTO-ENTMCNC: 32.7 G/DL (ref 31–37)
MCV RBC AUTO: 77.8 FL (ref 80–100)
MONOCYTES NFR BLD: 0.6 K/UL (ref 0.1–1.2)
MONOCYTES NFR BLD: 9 % (ref 2–11)
NEUTROPHILS NFR BLD: 60 % (ref 36–66)
NEUTS SEG NFR BLD: 4.1 K/UL (ref 1.8–7.7)
NITRITE UR QL STRIP: NEGATIVE
PH UR STRIP: 6.5 [PH] (ref 5–8)
PLATELET # BLD AUTO: 267 K/UL (ref 140–450)
PMV BLD AUTO: 6.5 FL (ref 6–12)
POTASSIUM SERPL-SCNC: 5.6 MMOL/L (ref 3.7–5.3)
PROT UR STRIP-MCNC: NEGATIVE MG/DL
RBC # BLD AUTO: 4.01 M/UL (ref 4–5.2)
RBC #/AREA URNS HPF: ABNORMAL /HPF (ref 0–2)
SODIUM SERPL-SCNC: 121 MMOL/L (ref 135–144)
SP GR UR STRIP: 1.01 (ref 1–1.03)
TROPONIN I SERPL HS-MCNC: 7 NG/L (ref 0–14)
UROBILINOGEN UR STRIP-ACNC: NORMAL EU/DL (ref 0–1)
WBC #/AREA URNS HPF: ABNORMAL /HPF (ref 0–5)
WBC OTHER # BLD: 6.9 K/UL (ref 3.5–11)

## 2024-06-20 PROCEDURE — 84156 ASSAY OF PROTEIN URINE: CPT

## 2024-06-20 PROCEDURE — 83735 ASSAY OF MAGNESIUM: CPT

## 2024-06-20 PROCEDURE — 1210000000 HC MED SURG R&B

## 2024-06-20 PROCEDURE — 80048 BASIC METABOLIC PNL TOTAL CA: CPT

## 2024-06-20 PROCEDURE — 83935 ASSAY OF URINE OSMOLALITY: CPT

## 2024-06-20 PROCEDURE — 81001 URINALYSIS AUTO W/SCOPE: CPT

## 2024-06-20 PROCEDURE — 87186 SC STD MICRODIL/AGAR DIL: CPT

## 2024-06-20 PROCEDURE — 87086 URINE CULTURE/COLONY COUNT: CPT

## 2024-06-20 PROCEDURE — 6370000000 HC RX 637 (ALT 250 FOR IP)

## 2024-06-20 PROCEDURE — 2580000003 HC RX 258: Performed by: NURSE PRACTITIONER

## 2024-06-20 PROCEDURE — 93005 ELECTROCARDIOGRAM TRACING: CPT | Performed by: NURSE PRACTITIONER

## 2024-06-20 PROCEDURE — 6360000002 HC RX W HCPCS: Performed by: NURSE PRACTITIONER

## 2024-06-20 PROCEDURE — 85025 COMPLETE CBC W/AUTO DIFF WBC: CPT

## 2024-06-20 PROCEDURE — 6370000000 HC RX 637 (ALT 250 FOR IP): Performed by: NURSE PRACTITIONER

## 2024-06-20 PROCEDURE — 84484 ASSAY OF TROPONIN QUANT: CPT

## 2024-06-20 PROCEDURE — 84300 ASSAY OF URINE SODIUM: CPT

## 2024-06-20 PROCEDURE — 71045 X-RAY EXAM CHEST 1 VIEW: CPT

## 2024-06-20 PROCEDURE — 87077 CULTURE AEROBIC IDENTIFY: CPT

## 2024-06-20 PROCEDURE — 82436 ASSAY OF URINE CHLORIDE: CPT

## 2024-06-20 PROCEDURE — 82570 ASSAY OF URINE CREATININE: CPT

## 2024-06-20 PROCEDURE — 87040 BLOOD CULTURE FOR BACTERIA: CPT

## 2024-06-20 PROCEDURE — 36415 COLL VENOUS BLD VENIPUNCTURE: CPT

## 2024-06-20 PROCEDURE — 80051 ELECTROLYTE PANEL: CPT

## 2024-06-20 PROCEDURE — 2580000003 HC RX 258

## 2024-06-20 PROCEDURE — 99285 EMERGENCY DEPT VISIT HI MDM: CPT

## 2024-06-20 RX ORDER — SODIUM CHLORIDE 0.9 % (FLUSH) 0.9 %
5-40 SYRINGE (ML) INJECTION PRN
Status: DISCONTINUED | OUTPATIENT
Start: 2024-06-20 | End: 2024-06-25 | Stop reason: HOSPADM

## 2024-06-20 RX ORDER — SODIUM CHLORIDE 0.9 % (FLUSH) 0.9 %
5-40 SYRINGE (ML) INJECTION EVERY 12 HOURS SCHEDULED
Status: DISCONTINUED | OUTPATIENT
Start: 2024-06-20 | End: 2024-06-25 | Stop reason: HOSPADM

## 2024-06-20 RX ORDER — PANTOPRAZOLE SODIUM 40 MG/1
40 TABLET, DELAYED RELEASE ORAL
Status: DISCONTINUED | OUTPATIENT
Start: 2024-06-21 | End: 2024-06-25 | Stop reason: HOSPADM

## 2024-06-20 RX ORDER — POTASSIUM CHLORIDE 20 MEQ/1
40 TABLET, EXTENDED RELEASE ORAL PRN
Status: DISCONTINUED | OUTPATIENT
Start: 2024-06-20 | End: 2024-06-25 | Stop reason: HOSPADM

## 2024-06-20 RX ORDER — ONDANSETRON 4 MG/1
4 TABLET, ORALLY DISINTEGRATING ORAL EVERY 8 HOURS PRN
Status: DISCONTINUED | OUTPATIENT
Start: 2024-06-20 | End: 2024-06-25 | Stop reason: HOSPADM

## 2024-06-20 RX ORDER — ONDANSETRON 2 MG/ML
4 INJECTION INTRAMUSCULAR; INTRAVENOUS EVERY 6 HOURS PRN
Status: DISCONTINUED | OUTPATIENT
Start: 2024-06-20 | End: 2024-06-25 | Stop reason: HOSPADM

## 2024-06-20 RX ORDER — POTASSIUM CHLORIDE 7.45 MG/ML
10 INJECTION INTRAVENOUS PRN
Status: DISCONTINUED | OUTPATIENT
Start: 2024-06-20 | End: 2024-06-25 | Stop reason: HOSPADM

## 2024-06-20 RX ORDER — TAMSULOSIN HYDROCHLORIDE 0.4 MG/1
0.4 CAPSULE ORAL DAILY
Status: DISCONTINUED | OUTPATIENT
Start: 2024-06-21 | End: 2024-06-22

## 2024-06-20 RX ORDER — EZETIMIBE 10 MG/1
10 TABLET ORAL DAILY
Status: DISCONTINUED | OUTPATIENT
Start: 2024-06-21 | End: 2024-06-25 | Stop reason: HOSPADM

## 2024-06-20 RX ORDER — LATANOPROST 50 UG/ML
1 SOLUTION/ DROPS OPHTHALMIC NIGHTLY
Status: DISCONTINUED | OUTPATIENT
Start: 2024-06-20 | End: 2024-06-25 | Stop reason: HOSPADM

## 2024-06-20 RX ORDER — LISINOPRIL 10 MG/1
10 TABLET ORAL DAILY
Status: DISCONTINUED | OUTPATIENT
Start: 2024-06-21 | End: 2024-06-25 | Stop reason: HOSPADM

## 2024-06-20 RX ORDER — IPRATROPIUM BROMIDE AND ALBUTEROL SULFATE 2.5; .5 MG/3ML; MG/3ML
1 SOLUTION RESPIRATORY (INHALATION) ONCE
Status: COMPLETED | OUTPATIENT
Start: 2024-06-20 | End: 2024-06-20

## 2024-06-20 RX ORDER — SODIUM CHLORIDE 9 MG/ML
INJECTION, SOLUTION INTRAVENOUS PRN
Status: DISCONTINUED | OUTPATIENT
Start: 2024-06-20 | End: 2024-06-25 | Stop reason: HOSPADM

## 2024-06-20 RX ORDER — ESTRADIOL 0.1 MG/G
2 CREAM VAGINAL
Status: DISCONTINUED | OUTPATIENT
Start: 2024-06-21 | End: 2024-06-25 | Stop reason: HOSPADM

## 2024-06-20 RX ORDER — ACETAMINOPHEN 650 MG/1
650 SUPPOSITORY RECTAL EVERY 6 HOURS PRN
Status: DISCONTINUED | OUTPATIENT
Start: 2024-06-20 | End: 2024-06-25 | Stop reason: HOSPADM

## 2024-06-20 RX ORDER — METOPROLOL SUCCINATE 50 MG/1
50 TABLET, EXTENDED RELEASE ORAL DAILY
Status: DISCONTINUED | OUTPATIENT
Start: 2024-06-21 | End: 2024-06-25 | Stop reason: HOSPADM

## 2024-06-20 RX ORDER — ACETAMINOPHEN 325 MG/1
650 TABLET ORAL EVERY 6 HOURS PRN
Status: DISCONTINUED | OUTPATIENT
Start: 2024-06-20 | End: 2024-06-25 | Stop reason: HOSPADM

## 2024-06-20 RX ORDER — POLYETHYLENE GLYCOL 3350 17 G/17G
17 POWDER, FOR SOLUTION ORAL DAILY PRN
Status: DISCONTINUED | OUTPATIENT
Start: 2024-06-20 | End: 2024-06-25 | Stop reason: HOSPADM

## 2024-06-20 RX ORDER — ALPRAZOLAM 0.25 MG/1
0.25 TABLET ORAL NIGHTLY PRN
Status: DISCONTINUED | OUTPATIENT
Start: 2024-06-20 | End: 2024-06-25 | Stop reason: HOSPADM

## 2024-06-20 RX ORDER — MAGNESIUM SULFATE IN WATER 40 MG/ML
2000 INJECTION, SOLUTION INTRAVENOUS PRN
Status: DISCONTINUED | OUTPATIENT
Start: 2024-06-20 | End: 2024-06-25 | Stop reason: HOSPADM

## 2024-06-20 RX ORDER — ENOXAPARIN SODIUM 100 MG/ML
40 INJECTION SUBCUTANEOUS DAILY
Status: DISCONTINUED | OUTPATIENT
Start: 2024-06-21 | End: 2024-06-25 | Stop reason: HOSPADM

## 2024-06-20 RX ORDER — ASPIRIN 81 MG/1
162 TABLET ORAL DAILY
Status: DISCONTINUED | OUTPATIENT
Start: 2024-06-21 | End: 2024-06-25 | Stop reason: HOSPADM

## 2024-06-20 RX ADMIN — IPRATROPIUM BROMIDE AND ALBUTEROL SULFATE 1 DOSE: .5; 2.5 SOLUTION RESPIRATORY (INHALATION) at 21:00

## 2024-06-20 RX ADMIN — LATANOPROST 1 DROP: 50 SOLUTION OPHTHALMIC at 23:34

## 2024-06-20 RX ADMIN — MEROPENEM 1000 MG: 1 INJECTION, POWDER, FOR SOLUTION INTRAVENOUS at 21:08

## 2024-06-20 RX ADMIN — ALPRAZOLAM 0.25 MG: 0.25 TABLET ORAL at 23:34

## 2024-06-20 RX ADMIN — SODIUM CHLORIDE, PRESERVATIVE FREE 10 ML: 5 INJECTION INTRAVENOUS at 23:34

## 2024-06-20 ASSESSMENT — PAIN - FUNCTIONAL ASSESSMENT: PAIN_FUNCTIONAL_ASSESSMENT: NONE - DENIES PAIN

## 2024-06-20 NOTE — ED PROVIDER NOTES
[address]    [address] [facility] [address]  Phone: [phone]  Emergency Department  Faculty Attestation    I performed a history and physical examination of the patient and discussed management with the mid level provider. I reviewed the mid level provider's note and agree with the documented findings and plan of care. Any areas of disagreement are noted on the chart. I was personally present for the key portions of any procedures. I have documented in the chart those procedures where I was not present during the key portions. I have reviewed the emergency nurses triage note. I agree with the chief complaint, past medical history, past surgical history, allergies, medications, social and family history as documented unless otherwise noted below. Documentation of the HPI, Physical Exam and Medical Decision Making performed by medical students or scribes is based on my personal performance of the HPI, PE and MDM. For Physician Assistant/ Nurse Practitioner cases/documentation I have personally evaluated this patient and have completed at least one if not all key elements of the E/M (history, physical exam, and MDM). Additional findings are as noted.      Primary Care Physician:  Rajinder Lemos MD    CHIEF COMPLAINT       Chief Complaint   Patient presents with    Fatigue     Pt has been feeling very weak and some sob for about a week.     Dysuria     Pt reports she just finished bactrim for uti, received call from urologist that she needs to see infectious disease and needs iv antibiotics.        RECENT VITALS:   Temp: 97.9 °F (36.6 °C),  Pulse: 64, Respirations: 16, BP: (!) 126/45    LABS:  Labs Reviewed   CULTURE, URINE - Abnormal; Notable for the following components:       Result Value    Culture   (*)     Value: KLEBSIELLA PNEUMONIAE 50 ,000 CFU/ML Identification by MALDI-TOF    All other components within normal limits   CBC WITH AUTO DIFFERENTIAL - Abnormal; Notable for the

## 2024-06-21 PROBLEM — R33.9 INCOMPLETE BLADDER EMPTYING: Status: ACTIVE | Noted: 2024-06-21

## 2024-06-21 PROBLEM — E87.1 HYPONATREMIA: Status: ACTIVE | Noted: 2024-06-21

## 2024-06-21 PROBLEM — E87.5 HYPERKALEMIA: Status: ACTIVE | Noted: 2024-06-21

## 2024-06-21 PROBLEM — R06.2 WHEEZING: Status: ACTIVE | Noted: 2024-06-21

## 2024-06-21 PROBLEM — E87.20 METABOLIC ACIDOSIS: Status: ACTIVE | Noted: 2024-06-21

## 2024-06-21 PROBLEM — I10 HYPERTENSION, ESSENTIAL: Status: ACTIVE | Noted: 2024-06-21

## 2024-06-21 LAB
ALBUMIN SERPL-MCNC: 3.5 G/DL (ref 3.5–5.2)
ALBUMIN/GLOB SERPL: 1.5 {RATIO} (ref 1–2.5)
ALP SERPL-CCNC: 92 U/L (ref 35–104)
ALT SERPL-CCNC: 14 U/L (ref 5–33)
ANION GAP SERPL CALCULATED.3IONS-SCNC: 10 MMOL/L (ref 9–17)
ANION GAP SERPL CALCULATED.3IONS-SCNC: 7 MMOL/L (ref 9–17)
AST SERPL-CCNC: 23 U/L
BASOPHILS # BLD: 0 K/UL (ref 0–0.2)
BASOPHILS NFR BLD: 1 % (ref 0–2)
BILIRUB DIRECT SERPL-MCNC: <0.1 MG/DL
BILIRUB INDIRECT SERPL-MCNC: ABNORMAL MG/DL (ref 0–1)
BILIRUB SERPL-MCNC: 0.2 MG/DL (ref 0.3–1.2)
BNP SERPL-MCNC: 70 PG/ML
BUN SERPL-MCNC: 16 MG/DL (ref 8–23)
CALCIUM SERPL-MCNC: 8.7 MG/DL (ref 8.6–10.4)
CHLORIDE SERPL-SCNC: 95 MMOL/L (ref 98–107)
CHLORIDE SERPL-SCNC: 97 MMOL/L (ref 98–107)
CHLORIDE SERPL-SCNC: 97 MMOL/L (ref 98–107)
CHLORIDE SERPL-SCNC: 98 MMOL/L (ref 98–107)
CHLORIDE SERPL-SCNC: 98 MMOL/L (ref 98–107)
CHLORIDE UR-SCNC: 43 MMOL/L
CO2 SERPL-SCNC: 19 MMOL/L (ref 20–31)
CO2 SERPL-SCNC: 19 MMOL/L (ref 20–31)
CO2 SERPL-SCNC: 20 MMOL/L (ref 20–31)
CO2 SERPL-SCNC: 20 MMOL/L (ref 20–31)
CO2 SERPL-SCNC: 21 MMOL/L (ref 20–31)
CREAT SERPL-MCNC: 0.8 MG/DL (ref 0.5–0.9)
EKG ATRIAL RATE: 63 BPM
EKG P AXIS: 66 DEGREES
EKG P-R INTERVAL: 218 MS
EKG Q-T INTERVAL: 376 MS
EKG QRS DURATION: 70 MS
EKG QTC CALCULATION (BAZETT): 384 MS
EKG R AXIS: 40 DEGREES
EKG T AXIS: 58 DEGREES
EKG VENTRICULAR RATE: 63 BPM
EOSINOPHIL # BLD: 0.1 K/UL (ref 0–0.4)
EOSINOPHILS RELATIVE PERCENT: 2 % (ref 1–4)
ERYTHROCYTE [DISTWIDTH] IN BLOOD BY AUTOMATED COUNT: 16.6 % (ref 12.5–15.4)
GFR, ESTIMATED: 73 ML/MIN/1.73M2
GLUCOSE BLD-MCNC: 102 MG/DL (ref 65–105)
GLUCOSE BLD-MCNC: 112 MG/DL (ref 65–105)
GLUCOSE BLD-MCNC: 182 MG/DL (ref 65–105)
GLUCOSE BLD-MCNC: 214 MG/DL (ref 65–105)
GLUCOSE BLD-MCNC: 98 MG/DL (ref 65–105)
GLUCOSE SERPL-MCNC: 109 MG/DL (ref 70–99)
HCT VFR BLD AUTO: 26.7 % (ref 36–46)
HGB BLD-MCNC: 9 G/DL (ref 12–16)
LYMPHOCYTES NFR BLD: 1.8 K/UL (ref 1–4.8)
LYMPHOCYTES RELATIVE PERCENT: 33 % (ref 24–44)
MAGNESIUM SERPL-MCNC: 1.9 MG/DL (ref 1.6–2.6)
MCH RBC QN AUTO: 26.1 PG (ref 26–34)
MCHC RBC AUTO-ENTMCNC: 33.8 G/DL (ref 31–37)
MCV RBC AUTO: 77.3 FL (ref 80–100)
MICROORGANISM SPEC CULT: ABNORMAL
MONOCYTES NFR BLD: 0.7 K/UL (ref 0.1–1.2)
MONOCYTES NFR BLD: 12 % (ref 2–11)
NEUTROPHILS NFR BLD: 52 % (ref 36–66)
NEUTS SEG NFR BLD: 2.9 K/UL (ref 1.8–7.7)
OSMOLALITY SERPL: 271 MOSM/KG (ref 275–295)
OSMOLALITY UR: 309 MOSM/KG (ref 80–1300)
PLATELET # BLD AUTO: 225 K/UL (ref 140–450)
PMV BLD AUTO: 6.4 FL (ref 6–12)
POTASSIUM SERPL-SCNC: 4.8 MMOL/L (ref 3.7–5.3)
POTASSIUM SERPL-SCNC: 5 MMOL/L (ref 3.7–5.3)
POTASSIUM SERPL-SCNC: 5.1 MMOL/L (ref 3.7–5.3)
POTASSIUM SERPL-SCNC: 5.2 MMOL/L (ref 3.7–5.3)
POTASSIUM SERPL-SCNC: 5.5 MMOL/L (ref 3.7–5.3)
PROT SERPL-MCNC: 5.9 G/DL (ref 6.4–8.3)
RBC # BLD AUTO: 3.45 M/UL (ref 4–5.2)
SARS-COV-2 RDRP RESP QL NAA+PROBE: NOT DETECTED
SERVICE CMNT-IMP: ABNORMAL
SODIUM SERPL-SCNC: 122 MMOL/L (ref 135–144)
SODIUM SERPL-SCNC: 127 MMOL/L (ref 135–144)
SODIUM SERPL-SCNC: 128 MMOL/L (ref 135–144)
SODIUM UR-SCNC: 49 MMOL/L
SPECIMEN DESCRIPTION: ABNORMAL
SPECIMEN DESCRIPTION: NORMAL
WBC OTHER # BLD: 5.6 K/UL (ref 3.5–11)

## 2024-06-21 PROCEDURE — 82248 BILIRUBIN DIRECT: CPT

## 2024-06-21 PROCEDURE — 6370000000 HC RX 637 (ALT 250 FOR IP)

## 2024-06-21 PROCEDURE — 87635 SARS-COV-2 COVID-19 AMP PRB: CPT

## 2024-06-21 PROCEDURE — 99223 1ST HOSP IP/OBS HIGH 75: CPT | Performed by: INTERNAL MEDICINE

## 2024-06-21 PROCEDURE — 51701 INSERT BLADDER CATHETER: CPT

## 2024-06-21 PROCEDURE — 2580000003 HC RX 258

## 2024-06-21 PROCEDURE — 51798 US URINE CAPACITY MEASURE: CPT

## 2024-06-21 PROCEDURE — 85025 COMPLETE CBC W/AUTO DIFF WBC: CPT

## 2024-06-21 PROCEDURE — 6360000002 HC RX W HCPCS: Performed by: NURSE PRACTITIONER

## 2024-06-21 PROCEDURE — 36415 COLL VENOUS BLD VENIPUNCTURE: CPT

## 2024-06-21 PROCEDURE — 80051 ELECTROLYTE PANEL: CPT

## 2024-06-21 PROCEDURE — 83735 ASSAY OF MAGNESIUM: CPT

## 2024-06-21 PROCEDURE — 99222 1ST HOSP IP/OBS MODERATE 55: CPT | Performed by: INTERNAL MEDICINE

## 2024-06-21 PROCEDURE — 2580000003 HC RX 258: Performed by: NURSE PRACTITIONER

## 2024-06-21 PROCEDURE — 6360000002 HC RX W HCPCS

## 2024-06-21 PROCEDURE — 94761 N-INVAS EAR/PLS OXIMETRY MLT: CPT

## 2024-06-21 PROCEDURE — 2500000003 HC RX 250 WO HCPCS

## 2024-06-21 PROCEDURE — 99223 1ST HOSP IP/OBS HIGH 75: CPT | Performed by: STUDENT IN AN ORGANIZED HEALTH CARE EDUCATION/TRAINING PROGRAM

## 2024-06-21 PROCEDURE — 83880 ASSAY OF NATRIURETIC PEPTIDE: CPT

## 2024-06-21 PROCEDURE — 6370000000 HC RX 637 (ALT 250 FOR IP): Performed by: INTERNAL MEDICINE

## 2024-06-21 PROCEDURE — 82947 ASSAY GLUCOSE BLOOD QUANT: CPT

## 2024-06-21 PROCEDURE — 83930 ASSAY OF BLOOD OSMOLALITY: CPT

## 2024-06-21 PROCEDURE — 80053 COMPREHEN METABOLIC PANEL: CPT

## 2024-06-21 PROCEDURE — 1210000000 HC MED SURG R&B

## 2024-06-21 PROCEDURE — 94640 AIRWAY INHALATION TREATMENT: CPT

## 2024-06-21 PROCEDURE — 99222 1ST HOSP IP/OBS MODERATE 55: CPT | Performed by: SPECIALIST

## 2024-06-21 RX ORDER — ALBUTEROL SULFATE 90 UG/1
2 AEROSOL, METERED RESPIRATORY (INHALATION)
Status: DISCONTINUED | OUTPATIENT
Start: 2024-06-21 | End: 2024-06-21

## 2024-06-21 RX ORDER — IPRATROPIUM BROMIDE AND ALBUTEROL SULFATE 2.5; .5 MG/3ML; MG/3ML
1 SOLUTION RESPIRATORY (INHALATION)
Status: DISCONTINUED | OUTPATIENT
Start: 2024-06-21 | End: 2024-06-21

## 2024-06-21 RX ORDER — GLUCAGON 1 MG/ML
1 KIT INJECTION PRN
Status: DISCONTINUED | OUTPATIENT
Start: 2024-06-21 | End: 2024-06-25 | Stop reason: HOSPADM

## 2024-06-21 RX ORDER — IPRATROPIUM BROMIDE AND ALBUTEROL SULFATE 2.5; .5 MG/3ML; MG/3ML
1 SOLUTION RESPIRATORY (INHALATION) EVERY 4 HOURS PRN
Status: DISCONTINUED | OUTPATIENT
Start: 2024-06-21 | End: 2024-06-25 | Stop reason: HOSPADM

## 2024-06-21 RX ORDER — ALBUTEROL SULFATE 90 UG/1
2 AEROSOL, METERED RESPIRATORY (INHALATION) EVERY 4 HOURS PRN
Status: DISCONTINUED | OUTPATIENT
Start: 2024-06-21 | End: 2024-06-25 | Stop reason: HOSPADM

## 2024-06-21 RX ORDER — INSULIN LISPRO 100 [IU]/ML
0-4 INJECTION, SOLUTION INTRAVENOUS; SUBCUTANEOUS
Status: DISCONTINUED | OUTPATIENT
Start: 2024-06-21 | End: 2024-06-25 | Stop reason: HOSPADM

## 2024-06-21 RX ORDER — INSULIN LISPRO 100 [IU]/ML
0-4 INJECTION, SOLUTION INTRAVENOUS; SUBCUTANEOUS NIGHTLY
Status: DISCONTINUED | OUTPATIENT
Start: 2024-06-21 | End: 2024-06-25 | Stop reason: HOSPADM

## 2024-06-21 RX ORDER — LEVOFLOXACIN 250 MG/1
250 TABLET, FILM COATED ORAL DAILY
Status: DISCONTINUED | OUTPATIENT
Start: 2024-06-21 | End: 2024-06-25 | Stop reason: HOSPADM

## 2024-06-21 RX ORDER — ALBUTEROL SULFATE 90 UG/1
2 AEROSOL, METERED RESPIRATORY (INHALATION) EVERY 6 HOURS PRN
Status: DISCONTINUED | OUTPATIENT
Start: 2024-06-21 | End: 2024-06-21

## 2024-06-21 RX ORDER — DEXTROSE MONOHYDRATE 100 MG/ML
INJECTION, SOLUTION INTRAVENOUS CONTINUOUS PRN
Status: DISCONTINUED | OUTPATIENT
Start: 2024-06-21 | End: 2024-06-25 | Stop reason: HOSPADM

## 2024-06-21 RX ADMIN — LATANOPROST 1 DROP: 50 SOLUTION OPHTHALMIC at 23:57

## 2024-06-21 RX ADMIN — TAMSULOSIN HYDROCHLORIDE 0.4 MG: 0.4 CAPSULE ORAL at 08:32

## 2024-06-21 RX ADMIN — SODIUM ZIRCONIUM CYCLOSILICATE 10 G: 10 POWDER, FOR SUSPENSION ORAL at 11:09

## 2024-06-21 RX ADMIN — MEROPENEM 1000 MG: 1 INJECTION INTRAVENOUS at 10:23

## 2024-06-21 RX ADMIN — ASPIRIN 162 MG: 81 TABLET, COATED ORAL at 08:31

## 2024-06-21 RX ADMIN — ALBUTEROL SULFATE 2 PUFF: 90 AEROSOL, METERED RESPIRATORY (INHALATION) at 19:48

## 2024-06-21 RX ADMIN — LEVOFLOXACIN 250 MG: 250 TABLET, FILM COATED ORAL at 17:02

## 2024-06-21 RX ADMIN — SODIUM CHLORIDE, PRESERVATIVE FREE 10 ML: 5 INJECTION INTRAVENOUS at 21:19

## 2024-06-21 RX ADMIN — DEXTROSE MONOHYDRATE 250 ML: 50 INJECTION, SOLUTION INTRAVENOUS at 11:02

## 2024-06-21 RX ADMIN — SODIUM BICARBONATE: 84 INJECTION, SOLUTION INTRAVENOUS at 12:21

## 2024-06-21 RX ADMIN — EZETIMIBE 10 MG: 10 TABLET ORAL at 10:23

## 2024-06-21 RX ADMIN — PANTOPRAZOLE SODIUM 40 MG: 40 TABLET, DELAYED RELEASE ORAL at 05:42

## 2024-06-21 RX ADMIN — SODIUM CHLORIDE, PRESERVATIVE FREE 10 ML: 5 INJECTION INTRAVENOUS at 08:34

## 2024-06-21 RX ADMIN — METOPROLOL SUCCINATE 50 MG: 50 TABLET, EXTENDED RELEASE ORAL at 08:31

## 2024-06-21 NOTE — PLAN OF CARE
Problem: Discharge Planning  Goal: Discharge to home or other facility with appropriate resources  Outcome: Progressing  Flowsheets (Taken 6/20/2024 2300)  Discharge to home or other facility with appropriate resources:   Identify barriers to discharge with patient and caregiver   Arrange for needed discharge resources and transportation as appropriate   Identify discharge learning needs (meds, wound care, etc)     Problem: Safety - Adult  Goal: Free from fall injury  Outcome: Progressing     Problem: Skin/Tissue Integrity - Adult  Goal: Skin integrity remains intact  Outcome: Progressing     Problem: Infection - Adult  Goal: Absence of infection at discharge  Outcome: Progressing

## 2024-06-21 NOTE — ED PROVIDER NOTES
[address]  EMERGENCY DEPARTMENT ENCOUNTER      Pt Name: Junior Soto  MRN: [id number]  Birthdate 1940  Date of evaluation: 6/20/2024  Provider: MENDEZ Robb CNP  2:16 PM    CHIEF COMPLAINT       Chief Complaint   Patient presents with    Fatigue     Pt has been feeling very weak and some sob for about a week.     Dysuria     Pt reports she just finished bactrim for uti, received call from urologist that she needs to see infectious disease and needs iv antibiotics.          HISTORY OF PRESENT ILLNESS    Junior Soto is a 84 y.o. female who presents to the emergency department for evaluation of feeling weak and short of breath.  Patient states that she just finished a course of Bactrim for UTI, her family doctor called her to let her know that the urinalysis showed significant infection, she need consultation with infectious disease and IV antibiotics.  She has been feeling weak.  No abdominal pain some nausea no vomiting. No chest pain but reports some shortness of breath. No fevers.  She has been experiencing a cough and some wheezing.    HPI    Nursing Notes were reviewed.    REVIEW OF SYSTEMS       Review of Systems   All other systems reviewed and are negative.      Except as noted above the remainder of the review of systems was reviewed and negative.       PAST MEDICAL HISTORY     Past Medical History:   Diagnosis Date    Acute pancreatitis without infection or necrosis 8/15/2023    Anxiety     CAD (coronary artery disease)     CKD (chronic kidney disease)     Diabetes mellitus (HCC)     Gastroesophageal reflux disease     Glaucoma     Hepatic cirrhosis (HCC)     Hyperlipidemia     Hypertension     Recurrent UTI          SURGICAL HISTORY       Past Surgical History:   Procedure Laterality Date    APPENDECTOMY      CARDIAC CATHETERIZATION      CHOLECYSTECTOMY, LAPAROSCOPIC  08/17/2023    LAPAROSCOPIC ROBOTIC CHOLECYSTECTOMY    CHOLECYSTECTOMY, LAPAROSCOPIC N/A 8/17/2023    LAPAROSCOPIC

## 2024-06-21 NOTE — H&P
[facility]  Inpatient Service     HISTORY AND PHYSICAL EXAMINATION            Date:   6/21/2024  Patient name:  Junior Soto  Date of admission:  6/20/2024  5:46 PM  MRN:   [id number]  Account:  [id number]  YOB: 1940  PCP:    Rajinder Lemos MD  Room:   [address] [place] [address]  Code Status:    Full Code  hPOA Name:   Mauro () [phone] (*if no hPOA, who is Emergency contact name/number)    History Obtained From:     patient    History of Present Illness:     Junior Soto is a 84 y.o. female with a PMH recurrent UTIs, CKD, hypertension, CAD, T2DM, GARCIA, GERD, hyponatremia, anxiety, and glaucoma who presents with symptoms of UTI and is admitted to the hospital for the management of UTI due to ESBL Klebsiella.    Patient reports she got a call from her urologist office Dr. Goode today who informed her she will need IV antibiotics for urinary tract infection.  Patient reports she recently completed a 10-day course of Bactrim.  Urology provided patient with infectious disease phone number, unfortunately they did not call her back after speaking with her. Patient did not want to stay with a urinary tract infection over the weekend so she decided come to the emergency room.  Patient reports she has been drinking a significant amount of water over the past couple days to help her flush out her UTI.  Patient also reports feeling significantly fatigued and \"wiped out\".  Patient also reports developing shortness of breath and wheezing that started about 10 days ago.  Patient reports developing a dry cough.  She did not receive any treatment for this.  She has never been diagnosed with asthma or use an inhaler.  She denies any sick contacts.  Patient believes the wheezing was secondary to the recent hot weather.  Currently in the room patient reports resolution of shortness of breath after DuoNeb treatment.  Patient also reported chills.  Patient

## 2024-06-21 NOTE — RT PROTOCOL NOTE
RT Inhaler-Nebulizer Bronchodilator Protocol Note    There is a bronchodilator order in the chart from a provider indicating to follow the RT Bronchodilator Protocol and there is an “Initiate RT Inhaler-Nebulizer Bronchodilator Protocol” order as well (see protocol at bottom of note).    CXR Findings:  XR CHEST PORTABLE    Result Date: 6/20/2024  No acute pulmonary findings.       The findings from the last RT Protocol Assessment were as follows:   History Pulmonary Disease: Smoker 15 pack years or more  Respiratory Pattern: Regular pattern and RR 12-20 bpm  Breath Sounds: Slightly diminished and/or crackles  Cough: Strong, spontaneous, non-productive  Indication for Bronchodilator Therapy: None  Bronchodilator Assessment Score: 3    Aerosolized bronchodilator medication orders have been revised according to the RT Inhaler-Nebulizer Bronchodilator Protocol below.    Respiratory Therapist to perform RT Therapy Protocol Assessment initially then follow the protocol.  Repeat RT Therapy Protocol Assessment PRN for score 0-3 or on second treatment, BID, and PRN for scores above 3.    No Indications - adjust the frequency to every 6 hours PRN wheezing or bronchospasm, if no treatments needed after 48 hours then discontinue using Per Protocol order mode.     If indication present, adjust the RT bronchodilator orders based on the Bronchodilator Assessment Score as indicated below.  Use Inhaler orders unless patient has one or more of the following: on home nebulizer, not able to hold breath for 10 seconds, is not alert and oriented, cannot activate and use MDI correctly, or respiratory rate 25 breaths per minute or more, then use the equivalent nebulizer order(s) with same Frequency and PRN reasons based on the score.  If a patient is on this medication at home then do not decrease Frequency below that used at home.    0-3 - enter or revise RT bronchodilator order(s) to equivalent RT Bronchodilator order with Frequency of

## 2024-06-21 NOTE — CARE COORDINATION
Case Management Assessment  Initial Evaluation    Date/Time of Evaluation: 6/21/2024 12:48 PM  Assessment Completed by: Bibi Hartley RN    If patient is discharged prior to next notation, then this note serves as note for discharge by case management.    Patient Name: Junior Soto                   YOB: 1940  Diagnosis: Hyponatremia [E87.1]  Urinary tract infection due to ESBL Klebsiella [N39.0, B96.89]  Urinary tract infection without hematuria, site unspecified [N39.0]                   Date / Time: 6/20/2024  5:46 PM    Patient Admission Status: Inpatient   Readmission Risk (Low < 19, Mod (19-27), High > 27): Readmission Risk Score: 15.7    Current PCP: Rajinder Lemos MD  PCP verified by CM? Yes    Chart Reviewed: Yes      History Provided by: Patient  Patient Orientation: Alert and Oriented    Patient Cognition: Alert    Hospitalization in the last 30 days (Readmission):  No    If yes, Readmission Assessment in CM Navigator will be completed.    Advance Directives:      Code Status: Full Code   Patient's Primary Decision Maker is: Legal Next of Kin      Discharge Planning:    Patient lives with: Spouse/Significant Other Type of Home: House  Primary Care Giver:    Patient Support Systems include: Spouse/Significant Other   Current Financial resources: Medicare  Current community resources: None  Current services prior to admission: None            Current DME:              Type of Home Care services:  None    ADLS  Prior functional level: Independent in ADLs/IADLs  Current functional level: Independent in ADLs/IADLs    PT AM-PAC:   /24  OT AM-PAC:   /24    Family can provide assistance at DC: Yes  Would you like Case Management to discuss the discharge plan with any other family members/significant others, and if so, who?    Plans to Return to Present Housing: Yes  Other Identified Issues/Barriers to RETURNING to current housing: clinical status  Potential Assistance needed at discharge:

## 2024-06-21 NOTE — PLAN OF CARE
Problem: Discharge Planning  Goal: Discharge to home or other facility with appropriate resources  Outcome: Adequate for Discharge     Problem: Safety - Adult  Goal: Free from fall injury  Outcome: Adequate for Discharge     Problem: Skin/Tissue Integrity - Adult  Goal: Skin integrity remains intact  Outcome: Adequate for Discharge     Problem: Infection - Adult  Goal: Absence of infection at discharge  Outcome: Progressing

## 2024-06-22 PROBLEM — R06.2 WHEEZING: Status: RESOLVED | Noted: 2024-06-21 | Resolved: 2024-06-22

## 2024-06-22 LAB
ALBUMIN SERPL-MCNC: 3.3 G/DL (ref 3.5–5.2)
ALBUMIN/GLOB SERPL: 1.5 {RATIO} (ref 1–2.5)
ALP SERPL-CCNC: 83 U/L (ref 35–104)
ALT SERPL-CCNC: 12 U/L (ref 5–33)
ANION GAP SERPL CALCULATED.3IONS-SCNC: 7 MMOL/L (ref 9–17)
ANION GAP SERPL CALCULATED.3IONS-SCNC: 8 MMOL/L (ref 9–17)
ANION GAP SERPL CALCULATED.3IONS-SCNC: 8 MMOL/L (ref 9–17)
ANION GAP SERPL CALCULATED.3IONS-SCNC: 9 MMOL/L (ref 9–17)
AST SERPL-CCNC: 20 U/L
BASOPHILS # BLD: 0 K/UL (ref 0–0.2)
BASOPHILS NFR BLD: 1 % (ref 0–2)
BILIRUB DIRECT SERPL-MCNC: 0.1 MG/DL
BILIRUB INDIRECT SERPL-MCNC: 0.3 MG/DL (ref 0–1)
BILIRUB SERPL-MCNC: 0.4 MG/DL (ref 0.3–1.2)
BUN SERPL-MCNC: 13 MG/DL (ref 8–23)
CALCIUM SERPL-MCNC: 8.3 MG/DL (ref 8.6–10.4)
CHLORIDE SERPL-SCNC: 93 MMOL/L (ref 98–107)
CHLORIDE SERPL-SCNC: 96 MMOL/L (ref 98–107)
CHLORIDE SERPL-SCNC: 96 MMOL/L (ref 98–107)
CHLORIDE SERPL-SCNC: 98 MMOL/L (ref 98–107)
CO2 SERPL-SCNC: 23 MMOL/L (ref 20–31)
CO2 SERPL-SCNC: 24 MMOL/L (ref 20–31)
CO2 SERPL-SCNC: 26 MMOL/L (ref 20–31)
CO2 SERPL-SCNC: 27 MMOL/L (ref 20–31)
CREAT SERPL-MCNC: 0.7 MG/DL (ref 0.5–0.9)
CREAT UR-MCNC: 47.4 MG/DL (ref 28–217)
DATE, STOOL #1: ABNORMAL
EOSINOPHIL # BLD: 0.1 K/UL (ref 0–0.4)
EOSINOPHILS RELATIVE PERCENT: 3 % (ref 1–4)
ERYTHROCYTE [DISTWIDTH] IN BLOOD BY AUTOMATED COUNT: 16.5 % (ref 12.5–15.4)
GFR, ESTIMATED: 86 ML/MIN/1.73M2
GLUCOSE BLD-MCNC: 110 MG/DL (ref 65–105)
GLUCOSE BLD-MCNC: 111 MG/DL (ref 65–105)
GLUCOSE BLD-MCNC: 119 MG/DL (ref 65–105)
GLUCOSE SERPL-MCNC: 105 MG/DL (ref 70–99)
HCT VFR BLD AUTO: 25.5 % (ref 36–46)
HEMOCCULT SP1 STL QL: POSITIVE
HGB BLD-MCNC: 8.6 G/DL (ref 12–16)
LYMPHOCYTES NFR BLD: 1.8 K/UL (ref 1–4.8)
LYMPHOCYTES RELATIVE PERCENT: 37 % (ref 24–44)
MAGNESIUM SERPL-MCNC: 1.8 MG/DL (ref 1.6–2.6)
MCH RBC QN AUTO: 25.9 PG (ref 26–34)
MCHC RBC AUTO-ENTMCNC: 33.7 G/DL (ref 31–37)
MCV RBC AUTO: 76.8 FL (ref 80–100)
MONOCYTES NFR BLD: 0.6 K/UL (ref 0.1–1.2)
MONOCYTES NFR BLD: 12 % (ref 2–11)
NEUTROPHILS NFR BLD: 47 % (ref 36–66)
NEUTS SEG NFR BLD: 2.3 K/UL (ref 1.8–7.7)
PLATELET # BLD AUTO: 229 K/UL (ref 140–450)
PMV BLD AUTO: 6.5 FL (ref 6–12)
POTASSIUM SERPL-SCNC: 4.4 MMOL/L (ref 3.7–5.3)
POTASSIUM SERPL-SCNC: 4.6 MMOL/L (ref 3.7–5.3)
POTASSIUM SERPL-SCNC: 4.7 MMOL/L (ref 3.7–5.3)
POTASSIUM SERPL-SCNC: 4.8 MMOL/L (ref 3.7–5.3)
PROT SERPL-MCNC: 5.5 G/DL (ref 6.4–8.3)
RBC # BLD AUTO: 3.33 M/UL (ref 4–5.2)
SODIUM SERPL-SCNC: 127 MMOL/L (ref 135–144)
SODIUM SERPL-SCNC: 128 MMOL/L (ref 135–144)
SODIUM SERPL-SCNC: 130 MMOL/L (ref 135–144)
SODIUM SERPL-SCNC: 130 MMOL/L (ref 135–144)
TIME, STOOL #1: ABNORMAL
TOTAL PROTEIN, URINE: 8 MG/DL
URINE TOTAL PROTEIN CREATININE RATIO: 0.18
WBC OTHER # BLD: 4.9 K/UL (ref 3.5–11)

## 2024-06-22 PROCEDURE — 82728 ASSAY OF FERRITIN: CPT

## 2024-06-22 PROCEDURE — 51798 US URINE CAPACITY MEASURE: CPT

## 2024-06-22 PROCEDURE — 6370000000 HC RX 637 (ALT 250 FOR IP): Performed by: INTERNAL MEDICINE

## 2024-06-22 PROCEDURE — 80053 COMPREHEN METABOLIC PANEL: CPT

## 2024-06-22 PROCEDURE — 6370000000 HC RX 637 (ALT 250 FOR IP)

## 2024-06-22 PROCEDURE — 99232 SBSQ HOSP IP/OBS MODERATE 35: CPT | Performed by: STUDENT IN AN ORGANIZED HEALTH CARE EDUCATION/TRAINING PROGRAM

## 2024-06-22 PROCEDURE — 83735 ASSAY OF MAGNESIUM: CPT

## 2024-06-22 PROCEDURE — 2580000003 HC RX 258

## 2024-06-22 PROCEDURE — 82248 BILIRUBIN DIRECT: CPT

## 2024-06-22 PROCEDURE — 82947 ASSAY GLUCOSE BLOOD QUANT: CPT

## 2024-06-22 PROCEDURE — 99232 SBSQ HOSP IP/OBS MODERATE 35: CPT | Performed by: INTERNAL MEDICINE

## 2024-06-22 PROCEDURE — 51701 INSERT BLADDER CATHETER: CPT

## 2024-06-22 PROCEDURE — 36415 COLL VENOUS BLD VENIPUNCTURE: CPT

## 2024-06-22 PROCEDURE — 85025 COMPLETE CBC W/AUTO DIFF WBC: CPT

## 2024-06-22 PROCEDURE — 80051 ELECTROLYTE PANEL: CPT

## 2024-06-22 PROCEDURE — 82272 OCCULT BLD FECES 1-3 TESTS: CPT

## 2024-06-22 PROCEDURE — 2500000003 HC RX 250 WO HCPCS

## 2024-06-22 PROCEDURE — 83550 IRON BINDING TEST: CPT

## 2024-06-22 PROCEDURE — 6370000000 HC RX 637 (ALT 250 FOR IP): Performed by: STUDENT IN AN ORGANIZED HEALTH CARE EDUCATION/TRAINING PROGRAM

## 2024-06-22 PROCEDURE — 1210000000 HC MED SURG R&B

## 2024-06-22 PROCEDURE — 83540 ASSAY OF IRON: CPT

## 2024-06-22 RX ORDER — FERROUS SULFATE 325(65) MG
325 TABLET, DELAYED RELEASE (ENTERIC COATED) ORAL
Status: DISCONTINUED | OUTPATIENT
Start: 2024-06-22 | End: 2024-06-25 | Stop reason: HOSPADM

## 2024-06-22 RX ADMIN — EZETIMIBE 10 MG: 10 TABLET ORAL at 09:21

## 2024-06-22 RX ADMIN — TAMSULOSIN HYDROCHLORIDE 0.4 MG: 0.4 CAPSULE ORAL at 09:21

## 2024-06-22 RX ADMIN — ACETAMINOPHEN 650 MG: 325 TABLET ORAL at 03:30

## 2024-06-22 RX ADMIN — SODIUM CHLORIDE, PRESERVATIVE FREE 10 ML: 5 INJECTION INTRAVENOUS at 20:44

## 2024-06-22 RX ADMIN — SODIUM CHLORIDE, PRESERVATIVE FREE 10 ML: 5 INJECTION INTRAVENOUS at 09:24

## 2024-06-22 RX ADMIN — LEVOFLOXACIN 250 MG: 250 TABLET, FILM COATED ORAL at 09:21

## 2024-06-22 RX ADMIN — METOPROLOL SUCCINATE 50 MG: 50 TABLET, EXTENDED RELEASE ORAL at 09:21

## 2024-06-22 RX ADMIN — ALPRAZOLAM 0.25 MG: 0.25 TABLET ORAL at 00:04

## 2024-06-22 RX ADMIN — SODIUM BICARBONATE: 84 INJECTION, SOLUTION INTRAVENOUS at 00:47

## 2024-06-22 RX ADMIN — LATANOPROST 1 DROP: 50 SOLUTION OPHTHALMIC at 20:44

## 2024-06-22 RX ADMIN — FERROUS SULFATE TAB EC 325 MG (65 MG FE EQUIVALENT) 325 MG: 325 (65 FE) TABLET DELAYED RESPONSE at 13:46

## 2024-06-22 RX ADMIN — ASPIRIN 162 MG: 81 TABLET, COATED ORAL at 09:21

## 2024-06-22 RX ADMIN — PANTOPRAZOLE SODIUM 40 MG: 40 TABLET, DELAYED RELEASE ORAL at 06:09

## 2024-06-22 ASSESSMENT — PAIN SCALES - GENERAL
PAINLEVEL_OUTOF10: 4
PAINLEVEL_OUTOF10: 0
PAINLEVEL_OUTOF10: 0

## 2024-06-22 ASSESSMENT — PAIN DESCRIPTION - LOCATION: LOCATION: HEAD

## 2024-06-22 ASSESSMENT — PAIN SCALES - WONG BAKER: WONGBAKER_NUMERICALRESPONSE: NO HURT

## 2024-06-22 ASSESSMENT — PAIN DESCRIPTION - DESCRIPTORS: DESCRIPTORS: ACHING

## 2024-06-22 NOTE — PLAN OF CARE
Problem: Discharge Planning  Goal: Discharge to home or other facility with appropriate resources  Outcome: Progressing     Problem: Safety - Adult  Goal: Free from fall injury  Outcome: Progressing     Problem: Skin/Tissue Integrity - Adult  Goal: Skin integrity remains intact  Outcome: Progressing     Problem: Infection - Adult  Goal: Absence of infection at discharge  Outcome: Progressing     Problem: Pain  Goal: Verbalizes/displays adequate comfort level or baseline comfort level  Outcome: Progressing

## 2024-06-23 PROBLEM — K92.1 MELENA: Status: ACTIVE | Noted: 2024-06-23

## 2024-06-23 PROBLEM — K74.60 CIRRHOSIS OF LIVER WITHOUT ASCITES (HCC): Status: ACTIVE | Noted: 2024-06-23

## 2024-06-23 LAB
AFP SERPL-MCNC: 4.4 UG/L
ALBUMIN SERPL-MCNC: 3.3 G/DL (ref 3.5–5.2)
ALBUMIN/GLOB SERPL: 1.5 {RATIO} (ref 1–2.5)
ALP SERPL-CCNC: 82 U/L (ref 35–104)
ALT SERPL-CCNC: 12 U/L (ref 5–33)
ANION GAP SERPL CALCULATED.3IONS-SCNC: 11 MMOL/L (ref 9–17)
ANION GAP SERPL CALCULATED.3IONS-SCNC: 4 MMOL/L (ref 9–17)
ANION GAP SERPL CALCULATED.3IONS-SCNC: 8 MMOL/L (ref 9–17)
ANION GAP SERPL CALCULATED.3IONS-SCNC: 8 MMOL/L (ref 9–17)
AST SERPL-CCNC: 21 U/L
BASOPHILS # BLD: 0 K/UL (ref 0–0.2)
BASOPHILS NFR BLD: 1 % (ref 0–2)
BILIRUB DIRECT SERPL-MCNC: <0.1 MG/DL
BILIRUB INDIRECT SERPL-MCNC: ABNORMAL MG/DL (ref 0–1)
BILIRUB SERPL-MCNC: 0.3 MG/DL (ref 0.3–1.2)
BUN SERPL-MCNC: 16 MG/DL (ref 8–23)
CALCIUM SERPL-MCNC: 8.5 MG/DL (ref 8.6–10.4)
CHLORIDE SERPL-SCNC: 101 MMOL/L (ref 98–107)
CHLORIDE SERPL-SCNC: 98 MMOL/L (ref 98–107)
CO2 SERPL-SCNC: 19 MMOL/L (ref 20–31)
CO2 SERPL-SCNC: 23 MMOL/L (ref 20–31)
CREAT SERPL-MCNC: 0.6 MG/DL (ref 0.5–0.9)
EOSINOPHIL # BLD: 0.2 K/UL (ref 0–0.4)
EOSINOPHILS RELATIVE PERCENT: 3 % (ref 1–4)
ERYTHROCYTE [DISTWIDTH] IN BLOOD BY AUTOMATED COUNT: 16.6 % (ref 12.5–15.4)
FERRITIN SERPL-MCNC: 60 NG/ML (ref 13–150)
GFR, ESTIMATED: 89 ML/MIN/1.73M2
GLUCOSE BLD-MCNC: 126 MG/DL (ref 65–105)
GLUCOSE BLD-MCNC: 141 MG/DL (ref 65–105)
GLUCOSE BLD-MCNC: 92 MG/DL (ref 65–105)
GLUCOSE BLD-MCNC: 94 MG/DL (ref 65–105)
GLUCOSE SERPL-MCNC: 84 MG/DL (ref 70–99)
HCT VFR BLD AUTO: 26.2 % (ref 36–46)
HGB BLD-MCNC: 8.9 G/DL (ref 12–16)
IRON SATN MFR SERPL: 23 % (ref 20–55)
IRON SERPL-MCNC: 89 UG/DL (ref 37–145)
LYMPHOCYTES NFR BLD: 2 K/UL (ref 1–4.8)
LYMPHOCYTES RELATIVE PERCENT: 38 % (ref 24–44)
MAGNESIUM SERPL-MCNC: 2 MG/DL (ref 1.6–2.6)
MCH RBC QN AUTO: 26.2 PG (ref 26–34)
MCHC RBC AUTO-ENTMCNC: 33.8 G/DL (ref 31–37)
MCV RBC AUTO: 77.4 FL (ref 80–100)
MICROORGANISM SPEC CULT: ABNORMAL
MONOCYTES NFR BLD: 0.6 K/UL (ref 0.1–1.2)
MONOCYTES NFR BLD: 11 % (ref 2–11)
NEUTROPHILS NFR BLD: 47 % (ref 36–66)
NEUTS SEG NFR BLD: 2.4 K/UL (ref 1.8–7.7)
PLATELET # BLD AUTO: 227 K/UL (ref 140–450)
PMV BLD AUTO: 6.2 FL (ref 6–12)
POTASSIUM SERPL-SCNC: 4.2 MMOL/L (ref 3.7–5.3)
POTASSIUM SERPL-SCNC: 4.5 MMOL/L (ref 3.7–5.3)
POTASSIUM SERPL-SCNC: 4.5 MMOL/L (ref 3.7–5.3)
POTASSIUM SERPL-SCNC: 4.7 MMOL/L (ref 3.7–5.3)
PROT SERPL-MCNC: 5.5 G/DL (ref 6.4–8.3)
RBC # BLD AUTO: 3.39 M/UL (ref 4–5.2)
SODIUM SERPL-SCNC: 128 MMOL/L (ref 135–144)
SODIUM SERPL-SCNC: 128 MMOL/L (ref 135–144)
SODIUM SERPL-SCNC: 129 MMOL/L (ref 135–144)
SODIUM SERPL-SCNC: 129 MMOL/L (ref 135–144)
SPECIMEN DESCRIPTION: ABNORMAL
TIBC SERPL-MCNC: 383 UG/DL (ref 250–450)
UNSATURATED IRON BINDING CAPACITY: 294 UG/DL (ref 112–347)
WBC OTHER # BLD: 5.3 K/UL (ref 3.5–11)

## 2024-06-23 PROCEDURE — 82248 BILIRUBIN DIRECT: CPT

## 2024-06-23 PROCEDURE — 6370000000 HC RX 637 (ALT 250 FOR IP): Performed by: STUDENT IN AN ORGANIZED HEALTH CARE EDUCATION/TRAINING PROGRAM

## 2024-06-23 PROCEDURE — 36415 COLL VENOUS BLD VENIPUNCTURE: CPT

## 2024-06-23 PROCEDURE — 6370000000 HC RX 637 (ALT 250 FOR IP)

## 2024-06-23 PROCEDURE — 51798 US URINE CAPACITY MEASURE: CPT

## 2024-06-23 PROCEDURE — 1210000000 HC MED SURG R&B

## 2024-06-23 PROCEDURE — 51701 INSERT BLADDER CATHETER: CPT

## 2024-06-23 PROCEDURE — 82105 ALPHA-FETOPROTEIN SERUM: CPT

## 2024-06-23 PROCEDURE — 80053 COMPREHEN METABOLIC PANEL: CPT

## 2024-06-23 PROCEDURE — 6370000000 HC RX 637 (ALT 250 FOR IP): Performed by: INTERNAL MEDICINE

## 2024-06-23 PROCEDURE — 82947 ASSAY GLUCOSE BLOOD QUANT: CPT

## 2024-06-23 PROCEDURE — 99232 SBSQ HOSP IP/OBS MODERATE 35: CPT | Performed by: INTERNAL MEDICINE

## 2024-06-23 PROCEDURE — 99232 SBSQ HOSP IP/OBS MODERATE 35: CPT | Performed by: STUDENT IN AN ORGANIZED HEALTH CARE EDUCATION/TRAINING PROGRAM

## 2024-06-23 PROCEDURE — 85025 COMPLETE CBC W/AUTO DIFF WBC: CPT

## 2024-06-23 PROCEDURE — 2580000003 HC RX 258

## 2024-06-23 PROCEDURE — 83735 ASSAY OF MAGNESIUM: CPT

## 2024-06-23 PROCEDURE — 80051 ELECTROLYTE PANEL: CPT

## 2024-06-23 RX ADMIN — ALPRAZOLAM 0.25 MG: 0.25 TABLET ORAL at 00:03

## 2024-06-23 RX ADMIN — FERROUS SULFATE TAB EC 325 MG (65 MG FE EQUIVALENT) 325 MG: 325 (65 FE) TABLET DELAYED RESPONSE at 09:46

## 2024-06-23 RX ADMIN — SODIUM CHLORIDE, PRESERVATIVE FREE 10 ML: 5 INJECTION INTRAVENOUS at 09:49

## 2024-06-23 RX ADMIN — ALPRAZOLAM 0.25 MG: 0.25 TABLET ORAL at 23:41

## 2024-06-23 RX ADMIN — EZETIMIBE 10 MG: 10 TABLET ORAL at 09:46

## 2024-06-23 RX ADMIN — ASPIRIN 162 MG: 81 TABLET, COATED ORAL at 09:46

## 2024-06-23 RX ADMIN — METOPROLOL SUCCINATE 50 MG: 50 TABLET, EXTENDED RELEASE ORAL at 09:46

## 2024-06-23 RX ADMIN — PANTOPRAZOLE SODIUM 40 MG: 40 TABLET, DELAYED RELEASE ORAL at 06:15

## 2024-06-23 RX ADMIN — LATANOPROST 1 DROP: 50 SOLUTION OPHTHALMIC at 20:29

## 2024-06-23 RX ADMIN — LEVOFLOXACIN 250 MG: 250 TABLET, FILM COATED ORAL at 09:46

## 2024-06-23 RX ADMIN — SODIUM CHLORIDE, PRESERVATIVE FREE 10 ML: 5 INJECTION INTRAVENOUS at 20:29

## 2024-06-23 NOTE — PLAN OF CARE
Problem: Discharge Planning  Goal: Discharge to home or other facility with appropriate resources  6/23/2024 1133 by Natalia Ortiz RN  Outcome: Progressing  6/23/2024 0651 by Eunice Rao, RN  Outcome: Progressing  Flowsheets  Taken 6/23/2024 0400  Discharge to home or other facility with appropriate resources:   Identify barriers to discharge with patient and caregiver   Arrange for needed discharge resources and transportation as appropriate   Identify discharge learning needs (meds, wound care, etc)   Refer to discharge planning if patient needs post-hospital services based on physician order or complex needs related to functional status, cognitive ability or social support system  Taken 6/23/2024 0000  Discharge to home or other facility with appropriate resources:   Identify barriers to discharge with patient and caregiver   Arrange for needed discharge resources and transportation as appropriate   Identify discharge learning needs (meds, wound care, etc)   Refer to discharge planning if patient needs post-hospital services based on physician order or complex needs related to functional status, cognitive ability or social support system  Taken 6/22/2024 2044  Discharge to home or other facility with appropriate resources:   Identify barriers to discharge with patient and caregiver   Arrange for needed discharge resources and transportation as appropriate   Identify discharge learning needs (meds, wound care, etc)   Refer to discharge planning if patient needs post-hospital services based on physician order or complex needs related to functional status, cognitive ability or social support system     Problem: Safety - Adult  Goal: Free from fall injury  6/23/2024 1133 by Natalia Ortiz RN  Outcome: Progressing  6/23/2024 0651 by Eunice Rao, RN  Outcome: Progressing     Problem: Skin/Tissue Integrity - Adult  Goal: Skin integrity remains intact  6/23/2024 1133 by Natalia Ortiz RN  Outcome: Progressing  6/23/2024

## 2024-06-23 NOTE — CARE COORDINATION
Social work; spoke to [facility] [phone] phone and fax [phone] ,where pt has been in the past. They can accept and the weekend person thinks with medicare patients they may provide the supplies for Straight cath but will need to confirm in the morning. Will need juan and Rx at discharge for supplies. Pt need to make appointment for Dr. Martinez [phone] in 3 weeks. Jessenia adamson

## 2024-06-23 NOTE — CONSULTS
Gastroenterology Consult Note    Patient:   Juniro Soto   Admit date:  6/20/2024  Facility:   [facility]  Referring/PCP: Rajinder Lemos MD  Date:     6/23/2024  Consultant:   MENDEZ Woodruff - TRACY, MD    Subjective:     This 84 y.o. female was admitted 6/20/2024 with a diagnosis of \"Hyponatremia [E87.1]  Urinary tract infection due to ESBL Klebsiella [N39.0, B96.89]  Urinary tract infection without hematuria, site unspecified [N39.0]\" and is seen in consultation regarding   Chief Complaint   Patient presents with    Fatigue     Pt has been feeling very weak and some sob for about a week.     Dysuria     Pt reports she just finished bactrim for uti, received call from urologist that she needs to see infectious disease and needs iv antibiotics.      84/F w/ pmhx of CAD, CKD, DM, GERD, HLD, MASH cirrhosis, HTN, recurrent UTI presents to ED with fatigue x 1 week, dysuria.  GI consulted for anemia.  Hgb on admission 10.2--> 9.0--> 8.6 --> 8.9  MCV 77.  Iron stores satisfactory. FOBT+  Patient reports a dark, sticky stool earlier today.  No hematochezia, hematemesis, vaginal discharge, hematuria.  Takes NSAIDs intermittently for back pain.  No AC. Takes daily aspirin.   Denies any dysphagia, odynophagia, dyspeptic symptoms.  No abdominal pain.    Father had colon cancer, age 79.  Last colonoscopy in march '22 with removal of 4 adenomatous colon polyps.        Past Medical History:  Past Medical History:   Diagnosis Date    Acute pancreatitis without infection or necrosis 8/15/2023    Anxiety     CAD (coronary artery disease)     CKD (chronic kidney disease)     Diabetes mellitus (HCC)     Gastroesophageal reflux disease     Glaucoma     Hepatic cirrhosis (HCC)     Hyperlipidemia     Hypertension     Recurrent UTI        Past Surgical History:  Past Surgical History:   Procedure Laterality Date    APPENDECTOMY      CARDIAC CATHETERIZATION      CHOLECYSTECTOMY, LAPAROSCOPIC  08/17/2023    
Renal Consult Note    Patient :  Junior Soto; 84 y.o. MRN# [id number]  Location:  323/323-01  Attending:  Marcela Haas MD  Admit Date:  6/20/2024   Hospital Day: 1    Reason for Consult:     Asked by Marcela Baldwin MD to see for hyponatremia.    History Obtained From:     patient, electronic medical record    History of Present Illness:     Junior Soto; 84 y.o. female with past medical history of diabetes type 2, hypertension, history of recurrent UTIs, coronary artery disease, history of pancreatitis, GARCIA, hyponatremia presented to the hospital with the chief complaint of dysuria and increased urinary frequency.  Patient also reported some shortness of breath and feeling fatigued.  Patient was sent to the hospital by her urologist Dr. Gallardo for further management.  Patient has history of recurrent UTIs and did recently complete 10-day course of Bactrim.  Patient reported drinking significant amount of water (more than her normal) in the last couple of days to flush her UTI out as per patient.  Her blood work on admission showed sodium 121, potassium 5.6, chloride 93, bicarb 19, calcium 9.1, BUN 17, creatinine 0.9 mg/dl.   UA was positive for UTI. Urine culture 6/18/2024 positive for Klebsiella ESBL. Chest x-ray negative.  Infectious disease has been consulted by primary.  Serum was matted from today showed 271.  Urine labs not available.  Patient did have elevated potassium today morning as well with a value of 5.5 which improved with medical management to 5.2.  Patient does have old sodium levels which range around high 120s to low 130s.  Patient was also found to have urine retention with postvoid residual of greater than 350 mL, to begin intermittent straight catheterization per urology.  Nephrology is consulted due to hyponatremia.    No history of recent contrast exposure, No h/o prolonged NSAIDs use in the past, No h/o nephrolithiasis, No recent skin rashes or arthralgias, No hematuria or 
Year: Never true     Ran Out of Food in the Last Year: Never true   Transportation Needs: No Transportation Needs (6/20/2024)    PRAPARE - Transportation     Lack of Transportation (Medical): No     Lack of Transportation (Non-Medical): No   Physical Activity: Not on file   Stress: Not on file   Social Connections: Not on file   Intimate Partner Violence: Not on file   Housing Stability: Low Risk  (6/20/2024)    Housing Stability Vital Sign     Unable to Pay for Housing in the Last Year: No     Number of Places Lived in the Last Year: 1     Unstable Housing in the Last Year: No       Family History:  No family history on file.    Physical Exam:      This a 84 y.o. female   Patient Vitals for the past 24 hrs:   BP Temp Temp src Pulse Resp SpO2 Height Weight   06/21/24 1518 (!) 102/47 98.1 °F (36.7 °C) Oral 62 16 98 % -- --   06/21/24 1229 -- -- -- -- -- 96 % -- --   06/21/24 0754 (!) 116/52 98.1 °F (36.7 °C) Oral 60 16 95 % -- --   06/21/24 0419 (!) 110/51 -- -- 62 -- -- -- --   06/21/24 0349 (!) 96/40 97.7 °F (36.5 °C) Oral 65 20 96 % -- --   06/20/24 2302 (!) 137/47 97.7 °F (36.5 °C) Oral 67 18 98 % -- --   06/20/24 2218 (!) 118/38 -- -- 63 16 96 % -- --   06/20/24 2103 -- -- -- -- -- 97 % -- --   06/20/24 1751 (!) 141/43 97.9 °F (36.6 °C) -- 63 18 98 % 1.626 m (5' 4\") 64 kg (141 lb)     Constitutional: Patient in no acute distress.  Neuro: Alert and oriented to person, place and time.  Psych: mood and affect normal  Abdomen: Soft, non-tender, non-distended with no CVA, flank pain.    LABS:   Recent Labs     06/20/24  1923 06/21/24  0430   WBC 6.9 5.6   HGB 10.2* 9.0*   HCT 31.2* 26.7*   MCV 77.8* 77.3*    225     Recent Labs     06/20/24  1923 06/20/24  2341 06/21/24  0430 06/21/24  0759 06/21/24  1545   *   < > 127* 127* 127*   K 5.6*   < > 5.1 5.5* 5.2   CL 93*   < > 98 98 97*   CO2 19*   < > 19* 19* 20   BUN 17  --  16  --   --    CREATININE 0.9  --  0.8  --   --     < > = values in this interval 
CHEST 6/20/2024 6:40 pm COMPARISON: None. HISTORY: ORDERING SYSTEM PROVIDED HISTORY: shortness of breath TECHNOLOGIST PROVIDED HISTORY: shortness of breath Reason for Exam: fatigue, coughing, congestion FINDINGS: Lungs: Clear. Pleura: No effusion or pneumothorax. Cardiomediastinal silhouette: Mild tortuosity and calcification of the thoracic aorta. Bones: No acute bony findings. Soft tissues: Normal.     No acute pulmonary findings.       Cultures:   Culture and Sensitivities:  Recent Labs     06/20/24 1923 06/21/24 0215   SPECDESC .BLOOD .NASOPHARYNGEAL SWAB   SPECIAL LEFT ARM 10ML  --    CULTURE NO GROWTH 12 HOURS  --      Procedure Component Value Units Date/Time   Blood Culture 1 [[id number]] Collected: 06/20/24 1923   Order Status: Completed Specimen: Blood Updated: 06/21/24 1029    Specimen Description .BLOOD    Special Requests LEFT ARM 10ML    Culture NO GROWTH 12 HOURS   Culture, Blood 2 [[id number]] Collected: 06/20/24 1922   Order Status: Completed Specimen: Blood Updated: 06/21/24 1026    Specimen Description .BLOOD    Special Requests RIGHT ARM 11ML    Culture NO GROWTH 12 HOURS   COVID-19, Rapid [[id number]] Collected: 06/21/24 0215   Order Status: Completed Specimen: Nasal from Nasopharyngeal Swab Updated: 06/21/24 0235    Specimen Description .NASOPHARYNGEAL SWAB    SARS-CoV-2, Rapid Not Detected    Comment:       Rapid NAAT:  The specimen is NEGATIVE for SARS-CoV-2, the novel coronavirus associated with  COVID-19.       The ID NOW COVID-19 assay is designed to detect the virus that causes COVID-19 in patients  with signs and symptoms of infection who are suspected of COVID-19.  An individual without symptoms of COVID-19 and who is not shedding SARS-CoV-2 virus would  expect to have a negative (not detected) result in this assay.  Negative results should be treated as presumptive and, if inconsistent with clinical signs  and symptoms or necessary for patient management,  should be tested with an

## 2024-06-23 NOTE — DISCHARGE INSTRUCTIONS
Date of admission: 6/20/2024  Date of discharge: 06/23/24    Your care was provided by the attending and resident physicians of the [facility]. The primary encounter diagnosis was Urinary tract infection without hematuria, site unspecified. A diagnosis of Hyponatremia was also pertinent to this visit.    MEDICATIONS   your albuterol with spacer and levofloxacin from the pharmacy and take as directed.  Your blood pressure medication was held due to the fact that you had low blood pressures during this hospital stay.  Discussed with your PCP when it is safe for you to restart this medication.  The Flomax was also stopped as it was noted to not be working by urology.  Discussed with them whether or not you should restart this medication.  Continue taking your other home medications as directed.    ADDITIONAL INSTRUCTIONS  You were noted to have blood in your stool and wheezing during this hospital stay.  For this reason you will follow-up with GI outpatient for possible colonoscopy and you have been given an albuterol inhaler to take and a referral to see a pulmonologist to evaluate your lungs and help manage your respiratory concerns.  Follow the instructions below regarding how to use an inhaler with a spacer.  Your sodium was noted to be low during your visit and therefore a lab order has been placed to follow your sodium 1 week after you are discharged.  Please discuss the results of your labs with your PCP and the nephrologist.  Please return to the [facility] if your symptoms worsen, if you experience chest pain, shortness of breath more than usual, you pass out, developed fevers or confusion, or any concerns whatsoever.      USING AN INHALER WITH A SPACER    How do I get the inhaler ready? The first time you use your inhaler, you need to get it ready. This is called \"priming.\" To do this, you:    Take the cap off of the

## 2024-06-23 NOTE — DISCHARGE INSTR - COC
Continuity of Care Form    Patient Name: Junior Soto   :  1940  MRN:  [id number]    Admit date:  2024  Discharge date:  2024    Code Status Order: Full Code   Advance Directives:     Admitting Physician:  Marcela Haas MD  PCP: Rajinder Lemos MD    Discharging Nurse: Charis Lemusarging [facility] Unit/Room#: 323/323-01  Discharging Unit Phone Number: [phone]    Emergency Contact:   Extended Emergency Contact Information  Primary Emergency Contact: ANNEL SOTO  Home Phone: [phone]  Mobile Phone: [phone]  Relation: Spouse    Past Surgical History:  Past Surgical History:   Procedure Laterality Date    APPENDECTOMY      CARDIAC CATHETERIZATION      CHOLECYSTECTOMY, LAPAROSCOPIC  2023    LAPAROSCOPIC ROBOTIC CHOLECYSTECTOMY    CHOLECYSTECTOMY, LAPAROSCOPIC N/A 2023    LAPAROSCOPIC ROBOTIC CHOLECYSTECTOMY performed by Johnathan Hernandez IV, DO at [facility] OR    COLONOSCOPY      HYSTERECTOMY (CERVIX STATUS UNKNOWN)         Immunization History:   Immunization History   Administered Date(s) Administered    COVID-19, MODERNA Bivalent, (age 12y+), IM, 50 mcg/0.5 mL 2022       Active Problems:  Patient Active Problem List   Diagnosis Code    Anxiety F41.9    Atrophic vaginitis N95.2    Gastroesophageal reflux disease K21.9    Glaucoma H40.9    Hyperlipidemia E78.5    Hypertensive kidney disease I12.9    Psoriasis L40.9    Symptoms involving urinary system R39.9    Type 2 diabetes mellitus without complication, without long-term current use of insulin (HCC) E11.9    GARCIA (nonalcoholic steatohepatitis) K75.81    Former smoker Z87.891    Coronary artery disease involving native coronary artery of native heart without angina pectoris I25.10    History of recurrent UTI (urinary tract infection) Z87.440    Urinary tract infection without hematuria N39.0    Acute cystitis with hematuria N30.01    Other retention of urine R33.8    Urinary tract infection due to ESBL

## 2024-06-23 NOTE — PLAN OF CARE
Problem: Discharge Planning  Goal: Discharge to home or other facility with appropriate resources  Outcome: Progressing  Flowsheets  Taken 6/23/2024 0400  Discharge to home or other facility with appropriate resources:   Identify barriers to discharge with patient and caregiver   Arrange for needed discharge resources and transportation as appropriate   Identify discharge learning needs (meds, wound care, etc)   Refer to discharge planning if patient needs post-hospital services based on physician order or complex needs related to functional status, cognitive ability or social support system  Taken 6/23/2024 0000  Discharge to home or other facility with appropriate resources:   Identify barriers to discharge with patient and caregiver   Arrange for needed discharge resources and transportation as appropriate   Identify discharge learning needs (meds, wound care, etc)   Refer to discharge planning if patient needs post-hospital services based on physician order or complex needs related to functional status, cognitive ability or social support system  Taken 6/22/2024 2044  Discharge to home or other facility with appropriate resources:   Identify barriers to discharge with patient and caregiver   Arrange for needed discharge resources and transportation as appropriate   Identify discharge learning needs (meds, wound care, etc)   Refer to discharge planning if patient needs post-hospital services based on physician order or complex needs related to functional status, cognitive ability or social support system     Problem: Safety - Adult  Goal: Free from fall injury  Outcome: Progressing     Problem: Skin/Tissue Integrity - Adult  Goal: Skin integrity remains intact  Outcome: Progressing  Flowsheets  Taken 6/23/2024 0400  Skin Integrity Remains Intact: Monitor for areas of redness and/or skin breakdown  Taken 6/23/2024 0000  Skin Integrity Remains Intact: Monitor for areas of redness and/or skin breakdown  Taken

## 2024-06-24 ENCOUNTER — APPOINTMENT (OUTPATIENT)
Dept: ULTRASOUND IMAGING | Age: 84
DRG: 690 | End: 2024-06-24
Payer: MEDICARE

## 2024-06-24 ENCOUNTER — ANESTHESIA (OUTPATIENT)
Dept: OPERATING ROOM | Age: 84
DRG: 690 | End: 2024-06-24
Payer: MEDICARE

## 2024-06-24 ENCOUNTER — ANESTHESIA EVENT (OUTPATIENT)
Dept: OPERATING ROOM | Age: 84
DRG: 690 | End: 2024-06-24
Payer: MEDICARE

## 2024-06-24 PROBLEM — E87.5 HYPERKALEMIA: Status: RESOLVED | Noted: 2024-06-21 | Resolved: 2024-06-24

## 2024-06-24 PROBLEM — Z16.12 INFECTION DUE TO EXTENDED-SPECTRUM BETA-LACTAMASE-PRODUCING KLEBSIELLA PNEUMONIAE: Status: ACTIVE | Noted: 2024-06-24

## 2024-06-24 PROBLEM — A49.8 INFECTION DUE TO EXTENDED-SPECTRUM BETA-LACTAMASE-PRODUCING KLEBSIELLA PNEUMONIAE: Status: ACTIVE | Noted: 2024-06-24

## 2024-06-24 LAB
ALBUMIN SERPL-MCNC: 3.3 G/DL (ref 3.5–5.2)
ALBUMIN/GLOB SERPL: 1.4 {RATIO} (ref 1–2.5)
ALP SERPL-CCNC: 82 U/L (ref 35–104)
ALT SERPL-CCNC: 14 U/L (ref 5–33)
ANION GAP SERPL CALCULATED.3IONS-SCNC: 8 MMOL/L (ref 9–17)
AST SERPL-CCNC: 23 U/L
BASOPHILS # BLD: 0 K/UL (ref 0–0.2)
BASOPHILS NFR BLD: 1 % (ref 0–2)
BILIRUB DIRECT SERPL-MCNC: 0.1 MG/DL
BILIRUB INDIRECT SERPL-MCNC: 0.2 MG/DL (ref 0–1)
BILIRUB SERPL-MCNC: 0.3 MG/DL (ref 0.3–1.2)
BUN SERPL-MCNC: 15 MG/DL (ref 8–23)
CALCIUM SERPL-MCNC: 8.5 MG/DL (ref 8.6–10.4)
CHLORIDE SERPL-SCNC: 102 MMOL/L (ref 98–107)
CO2 SERPL-SCNC: 22 MMOL/L (ref 20–31)
CREAT SERPL-MCNC: 0.5 MG/DL (ref 0.5–0.9)
EOSINOPHIL # BLD: 0.2 K/UL (ref 0–0.4)
EOSINOPHILS RELATIVE PERCENT: 3 % (ref 1–4)
ERYTHROCYTE [DISTWIDTH] IN BLOOD BY AUTOMATED COUNT: 17.1 % (ref 12.5–15.4)
GFR, ESTIMATED: >90 ML/MIN/1.73M2
GLUCOSE BLD-MCNC: 105 MG/DL (ref 65–105)
GLUCOSE BLD-MCNC: 129 MG/DL (ref 65–105)
GLUCOSE BLD-MCNC: 63 MG/DL (ref 65–105)
GLUCOSE BLD-MCNC: 85 MG/DL (ref 65–105)
GLUCOSE BLD-MCNC: 88 MG/DL (ref 65–105)
GLUCOSE BLD-MCNC: 97 MG/DL (ref 65–105)
GLUCOSE SERPL-MCNC: 87 MG/DL (ref 70–99)
HCT VFR BLD AUTO: 26.6 % (ref 36–46)
HGB BLD-MCNC: 8.8 G/DL (ref 12–16)
LYMPHOCYTES NFR BLD: 1.8 K/UL (ref 1–4.8)
LYMPHOCYTES RELATIVE PERCENT: 34 % (ref 24–44)
MAGNESIUM SERPL-MCNC: 2 MG/DL (ref 1.6–2.6)
MCH RBC QN AUTO: 25.8 PG (ref 26–34)
MCHC RBC AUTO-ENTMCNC: 33 G/DL (ref 31–37)
MCV RBC AUTO: 78 FL (ref 80–100)
MONOCYTES NFR BLD: 0.6 K/UL (ref 0.1–1.2)
MONOCYTES NFR BLD: 11 % (ref 2–11)
NEUTROPHILS NFR BLD: 51 % (ref 36–66)
NEUTS SEG NFR BLD: 2.6 K/UL (ref 1.8–7.7)
PLATELET # BLD AUTO: 241 K/UL (ref 140–450)
PMV BLD AUTO: 6.4 FL (ref 6–12)
POTASSIUM SERPL-SCNC: 4.4 MMOL/L (ref 3.7–5.3)
PROT SERPL-MCNC: 5.7 G/DL (ref 6.4–8.3)
RBC # BLD AUTO: 3.4 M/UL (ref 4–5.2)
SODIUM SERPL-SCNC: 132 MMOL/L (ref 135–144)
WBC OTHER # BLD: 5.2 K/UL (ref 3.5–11)

## 2024-06-24 PROCEDURE — 6370000000 HC RX 637 (ALT 250 FOR IP): Performed by: INTERNAL MEDICINE

## 2024-06-24 PROCEDURE — 99232 SBSQ HOSP IP/OBS MODERATE 35: CPT | Performed by: STUDENT IN AN ORGANIZED HEALTH CARE EDUCATION/TRAINING PROGRAM

## 2024-06-24 PROCEDURE — 99232 SBSQ HOSP IP/OBS MODERATE 35: CPT | Performed by: NURSE PRACTITIONER

## 2024-06-24 PROCEDURE — 82947 ASSAY GLUCOSE BLOOD QUANT: CPT

## 2024-06-24 PROCEDURE — 7100000001 HC PACU RECOVERY - ADDTL 15 MIN: Performed by: INTERNAL MEDICINE

## 2024-06-24 PROCEDURE — 43239 EGD BIOPSY SINGLE/MULTIPLE: CPT | Performed by: INTERNAL MEDICINE

## 2024-06-24 PROCEDURE — 99222 1ST HOSP IP/OBS MODERATE 55: CPT | Performed by: INTERNAL MEDICINE

## 2024-06-24 PROCEDURE — 6360000002 HC RX W HCPCS: Performed by: NURSE ANESTHETIST, CERTIFIED REGISTERED

## 2024-06-24 PROCEDURE — 80053 COMPREHEN METABOLIC PANEL: CPT

## 2024-06-24 PROCEDURE — 2580000003 HC RX 258

## 2024-06-24 PROCEDURE — 6370000000 HC RX 637 (ALT 250 FOR IP)

## 2024-06-24 PROCEDURE — 1210000000 HC MED SURG R&B

## 2024-06-24 PROCEDURE — 76705 ECHO EXAM OF ABDOMEN: CPT

## 2024-06-24 PROCEDURE — 36415 COLL VENOUS BLD VENIPUNCTURE: CPT

## 2024-06-24 PROCEDURE — 85025 COMPLETE CBC W/AUTO DIFF WBC: CPT

## 2024-06-24 PROCEDURE — 2709999900 HC NON-CHARGEABLE SUPPLY: Performed by: INTERNAL MEDICINE

## 2024-06-24 PROCEDURE — 3609012400 HC EGD TRANSORAL BIOPSY SINGLE/MULTIPLE: Performed by: INTERNAL MEDICINE

## 2024-06-24 PROCEDURE — 7100000000 HC PACU RECOVERY - FIRST 15 MIN: Performed by: INTERNAL MEDICINE

## 2024-06-24 PROCEDURE — 51798 US URINE CAPACITY MEASURE: CPT

## 2024-06-24 PROCEDURE — 83735 ASSAY OF MAGNESIUM: CPT

## 2024-06-24 PROCEDURE — 82248 BILIRUBIN DIRECT: CPT

## 2024-06-24 PROCEDURE — 3700000000 HC ANESTHESIA ATTENDED CARE: Performed by: INTERNAL MEDICINE

## 2024-06-24 PROCEDURE — 0DB98ZX EXCISION OF DUODENUM, VIA NATURAL OR ARTIFICIAL OPENING ENDOSCOPIC, DIAGNOSTIC: ICD-10-PCS | Performed by: INTERNAL MEDICINE

## 2024-06-24 PROCEDURE — 2500000003 HC RX 250 WO HCPCS: Performed by: NURSE ANESTHETIST, CERTIFIED REGISTERED

## 2024-06-24 PROCEDURE — 51701 INSERT BLADDER CATHETER: CPT

## 2024-06-24 RX ORDER — ONDANSETRON 2 MG/ML
4 INJECTION INTRAMUSCULAR; INTRAVENOUS
Status: DISCONTINUED | OUTPATIENT
Start: 2024-06-24 | End: 2024-06-24 | Stop reason: HOSPADM

## 2024-06-24 RX ORDER — LEVOFLOXACIN 250 MG/1
250 TABLET, FILM COATED ORAL DAILY
Qty: 3 TABLET | Refills: 0 | Status: SHIPPED | OUTPATIENT
Start: 2024-06-25 | End: 2024-06-28

## 2024-06-24 RX ORDER — LIDOCAINE HYDROCHLORIDE 10 MG/ML
1 INJECTION, SOLUTION EPIDURAL; INFILTRATION; INTRACAUDAL; PERINEURAL
Status: CANCELLED | OUTPATIENT
Start: 2024-06-24 | End: 2024-06-25

## 2024-06-24 RX ORDER — IPRATROPIUM BROMIDE AND ALBUTEROL SULFATE 2.5; .5 MG/3ML; MG/3ML
SOLUTION RESPIRATORY (INHALATION)
Status: DISPENSED
Start: 2024-06-24 | End: 2024-06-25

## 2024-06-24 RX ORDER — PROPOFOL 10 MG/ML
INJECTION, EMULSION INTRAVENOUS CONTINUOUS PRN
Status: DISCONTINUED | OUTPATIENT
Start: 2024-06-24 | End: 2024-06-24 | Stop reason: SDUPTHER

## 2024-06-24 RX ORDER — SODIUM CHLORIDE 0.9 % (FLUSH) 0.9 %
5-40 SYRINGE (ML) INJECTION EVERY 12 HOURS SCHEDULED
Status: DISCONTINUED | OUTPATIENT
Start: 2024-06-24 | End: 2024-06-24 | Stop reason: HOSPADM

## 2024-06-24 RX ORDER — PROPOFOL 10 MG/ML
INJECTION, EMULSION INTRAVENOUS PRN
Status: DISCONTINUED | OUTPATIENT
Start: 2024-06-24 | End: 2024-06-24 | Stop reason: SDUPTHER

## 2024-06-24 RX ORDER — SODIUM CHLORIDE 9 MG/ML
INJECTION, SOLUTION INTRAVENOUS PRN
Status: CANCELLED | OUTPATIENT
Start: 2024-06-24

## 2024-06-24 RX ORDER — DIPHENHYDRAMINE HYDROCHLORIDE 50 MG/ML
12.5 INJECTION INTRAMUSCULAR; INTRAVENOUS
Status: DISCONTINUED | OUTPATIENT
Start: 2024-06-24 | End: 2024-06-24 | Stop reason: HOSPADM

## 2024-06-24 RX ORDER — LIDOCAINE HYDROCHLORIDE 20 MG/ML
INJECTION, SOLUTION INFILTRATION; PERINEURAL PRN
Status: DISCONTINUED | OUTPATIENT
Start: 2024-06-24 | End: 2024-06-24 | Stop reason: SDUPTHER

## 2024-06-24 RX ORDER — MEPERIDINE HYDROCHLORIDE 50 MG/ML
12.5 INJECTION INTRAMUSCULAR; INTRAVENOUS; SUBCUTANEOUS ONCE
Status: DISCONTINUED | OUTPATIENT
Start: 2024-06-24 | End: 2024-06-24 | Stop reason: HOSPADM

## 2024-06-24 RX ORDER — MIDAZOLAM HYDROCHLORIDE 2 MG/2ML
2 INJECTION, SOLUTION INTRAMUSCULAR; INTRAVENOUS
Status: DISCONTINUED | OUTPATIENT
Start: 2024-06-24 | End: 2024-06-24 | Stop reason: HOSPADM

## 2024-06-24 RX ORDER — HYDRALAZINE HYDROCHLORIDE 20 MG/ML
10 INJECTION INTRAMUSCULAR; INTRAVENOUS
Status: DISCONTINUED | OUTPATIENT
Start: 2024-06-24 | End: 2024-06-24 | Stop reason: HOSPADM

## 2024-06-24 RX ORDER — SODIUM CHLORIDE, SODIUM LACTATE, POTASSIUM CHLORIDE, CALCIUM CHLORIDE 600; 310; 30; 20 MG/100ML; MG/100ML; MG/100ML; MG/100ML
INJECTION, SOLUTION INTRAVENOUS CONTINUOUS
Status: CANCELLED | OUTPATIENT
Start: 2024-06-24

## 2024-06-24 RX ORDER — OXYCODONE HYDROCHLORIDE 5 MG/1
10 TABLET ORAL PRN
Status: DISCONTINUED | OUTPATIENT
Start: 2024-06-24 | End: 2024-06-24 | Stop reason: HOSPADM

## 2024-06-24 RX ORDER — SODIUM CHLORIDE 0.9 % (FLUSH) 0.9 %
5-40 SYRINGE (ML) INJECTION PRN
Status: CANCELLED | OUTPATIENT
Start: 2024-06-24

## 2024-06-24 RX ORDER — IPRATROPIUM BROMIDE AND ALBUTEROL SULFATE 2.5; .5 MG/3ML; MG/3ML
1 SOLUTION RESPIRATORY (INHALATION) ONCE
Status: DISCONTINUED | OUTPATIENT
Start: 2024-06-24 | End: 2024-06-25 | Stop reason: HOSPADM

## 2024-06-24 RX ORDER — LABETALOL HYDROCHLORIDE 5 MG/ML
10 INJECTION, SOLUTION INTRAVENOUS
Status: DISCONTINUED | OUTPATIENT
Start: 2024-06-24 | End: 2024-06-24 | Stop reason: HOSPADM

## 2024-06-24 RX ORDER — SODIUM CHLORIDE 0.9 % (FLUSH) 0.9 %
5-40 SYRINGE (ML) INJECTION PRN
Status: DISCONTINUED | OUTPATIENT
Start: 2024-06-24 | End: 2024-06-24 | Stop reason: HOSPADM

## 2024-06-24 RX ORDER — NALOXONE HYDROCHLORIDE 0.4 MG/ML
INJECTION, SOLUTION INTRAMUSCULAR; INTRAVENOUS; SUBCUTANEOUS PRN
Status: DISCONTINUED | OUTPATIENT
Start: 2024-06-24 | End: 2024-06-24 | Stop reason: HOSPADM

## 2024-06-24 RX ORDER — OXYCODONE HYDROCHLORIDE 5 MG/1
5 TABLET ORAL PRN
Status: DISCONTINUED | OUTPATIENT
Start: 2024-06-24 | End: 2024-06-24 | Stop reason: HOSPADM

## 2024-06-24 RX ORDER — METOCLOPRAMIDE HYDROCHLORIDE 5 MG/ML
10 INJECTION INTRAMUSCULAR; INTRAVENOUS
Status: DISCONTINUED | OUTPATIENT
Start: 2024-06-24 | End: 2024-06-24 | Stop reason: HOSPADM

## 2024-06-24 RX ORDER — SODIUM CHLORIDE 9 MG/ML
INJECTION, SOLUTION INTRAVENOUS PRN
Status: DISCONTINUED | OUTPATIENT
Start: 2024-06-24 | End: 2024-06-24 | Stop reason: HOSPADM

## 2024-06-24 RX ORDER — MORPHINE SULFATE 2 MG/ML
1 INJECTION, SOLUTION INTRAMUSCULAR; INTRAVENOUS EVERY 5 MIN PRN
Status: DISCONTINUED | OUTPATIENT
Start: 2024-06-24 | End: 2024-06-24 | Stop reason: HOSPADM

## 2024-06-24 RX ORDER — SODIUM CHLORIDE 0.9 % (FLUSH) 0.9 %
5-40 SYRINGE (ML) INJECTION EVERY 12 HOURS SCHEDULED
Status: CANCELLED | OUTPATIENT
Start: 2024-06-24

## 2024-06-24 RX ADMIN — LEVOFLOXACIN 250 MG: 250 TABLET, FILM COATED ORAL at 10:16

## 2024-06-24 RX ADMIN — EZETIMIBE 10 MG: 10 TABLET ORAL at 10:16

## 2024-06-24 RX ADMIN — SODIUM CHLORIDE, PRESERVATIVE FREE 10 ML: 5 INJECTION INTRAVENOUS at 20:58

## 2024-06-24 RX ADMIN — ESTRADIOL 2 G: 0.1 CREAM VAGINAL at 00:09

## 2024-06-24 RX ADMIN — LIDOCAINE HYDROCHLORIDE 100 MG: 20 INJECTION, SOLUTION INFILTRATION; PERINEURAL at 14:56

## 2024-06-24 RX ADMIN — LATANOPROST 1 DROP: 50 SOLUTION OPHTHALMIC at 20:57

## 2024-06-24 RX ADMIN — SODIUM CHLORIDE: 9 INJECTION, SOLUTION INTRAVENOUS at 13:58

## 2024-06-24 RX ADMIN — PROPOFOL 150 MCG/KG/MIN: 10 INJECTION, EMULSION INTRAVENOUS at 14:56

## 2024-06-24 RX ADMIN — PROPOFOL 50 MG: 10 INJECTION, EMULSION INTRAVENOUS at 14:56

## 2024-06-24 RX ADMIN — ALPRAZOLAM 0.25 MG: 0.25 TABLET ORAL at 23:42

## 2024-06-24 ASSESSMENT — PAIN - FUNCTIONAL ASSESSMENT: PAIN_FUNCTIONAL_ASSESSMENT: 0-10

## 2024-06-24 NOTE — OP NOTE
PROCEDURE NOTE    DATE OF PROCEDURE: 6/24/2024     SURGEON: Dillon Montalvo MD  Facility: [facility]  ASSISTANT: None  Anesthesia: Monitored anesthesia care  PREOPERATIVE DIAGNOSIS: GI bleed    Diagnosis:    POSTOPERATIVE DIAGNOSIS: As described below    OPERATION: Upper GI endoscopy with Biopsy    ANESTHESIA: Moderate Sedation     ESTIMATED BLOOD LOSS: Less than 50 ml    COMPLICATIONS: None.     SPECIMENS:  Was Obtained: duodenal biopsy    HISTORY: The patient is a 84 y.o. year old female with history of above preop diagnosis.  I recommended esophagogastroduodenoscopy with possible biopsy and I explained the risk, benefits, expected outcome, and alternatives to the procedure.  Risks included but are not limited to bleeding, infection, respiratory distress, hypotension, and perforation of the esophagus, stomach, or duodenum.  Patient understands and is in agreement.      PROCEDURE: The patient was given IV conscious sedation.  The patient's SPO2 remained above 90% throughout the procedure.The gastroscope was inserted orally and advanced under direct vision through the esophagus, through the stomach, through the pylorus, and into the descending duodenum.      Post sedation note :The patient's SPO2 remained above 90% throughout the procedure.the vital signs remained stable , and no immediate complication form the procedure noted, patient will be ready for d/c when criteria is met .      Findings:    Retropharyngeal area was grossly normal appearing    Esophagus: normal;     Esophagogastric markings: Diaphragmatic hiatus- 38 cm; GE junction- 38 cm; Squamo-columnar junction- 38 cm    Stomach:    Fundus: normal    Body: normal    Antrum: normal    Duodenum:     Descending: normal    Bulb: normal  Biopsy obtained to r/o- celiac disease     The scope was removed and the patient tolerated the procedure well.     Impression- Normal exam    Recommendations/Plan:   F/U Biopsies  F/U In Office in 3-4 weeks  Discussed with the

## 2024-06-24 NOTE — ANESTHESIA PRE PROCEDURE
°F (36.4 °C) 97.9 °F (36.6 °C) 97.9 °F (36.6 °C)   TempSrc: Oral Oral Oral Oral   SpO2: 97% 96% 95% 97%   Weight:       Height:                                                  BP Readings from Last 3 Encounters:   06/24/24 (!) 107/48   04/25/24 (!) 154/70   01/09/24 (!) 118/58       NPO Status:                                                                                 BMI:   Wt Readings from Last 3 Encounters:   06/20/24 64 kg (141 lb)   04/25/24 65.8 kg (145 lb)   01/07/24 62.5 kg (137 lb 12.6 oz)     Body mass index is 24.2 kg/m².    CBC:   Lab Results   Component Value Date/Time    WBC 5.2 06/24/2024 05:32 AM    RBC 3.40 06/24/2024 05:32 AM    HGB 8.8 06/24/2024 05:32 AM    HCT 26.6 06/24/2024 05:32 AM    MCV 78.0 06/24/2024 05:32 AM    RDW 17.1 06/24/2024 05:32 AM     06/24/2024 05:32 AM       CMP:   Lab Results   Component Value Date/Time     06/24/2024 05:32 AM    K 4.4 06/24/2024 05:32 AM     06/24/2024 05:32 AM    CO2 22 06/24/2024 05:32 AM    BUN 15 06/24/2024 05:32 AM    CREATININE 0.5 06/24/2024 05:32 AM    LABGLOM >90 06/24/2024 05:32 AM    LABGLOM >60 01/09/2024 06:27 AM    GLUCOSE 87 06/24/2024 05:32 AM    CALCIUM 8.5 06/24/2024 05:32 AM    BILITOT 0.3 06/24/2024 05:32 AM    ALKPHOS 82 06/24/2024 05:32 AM    AST 23 06/24/2024 05:32 AM    ALT 14 06/24/2024 05:32 AM       POC Tests:   Recent Labs     06/24/24  1126   POCGLU 88       Coags:   Lab Results   Component Value Date/Time    PROTIME 11.4 08/16/2023 05:34 AM    INR 1.1 08/16/2023 05:34 AM       HCG (If Applicable): No results found for: \"PREGTESTUR\", \"PREGSERUM\", \"HCG\", \"HCGQUANT\"     ABGs: No results found for: \"PHART\", \"PO2ART\", \"ASP4ECF\", \"UCI8IJC\", \"BEART\", \"T5GJQZHP\"     Type & Screen (If Applicable):  No results found for: \"LABABO\"    Drug/Infectious Status (If Applicable):  No results found for: \"HIV\", \"HEPCAB\"    COVID-19 Screening (If Applicable):   Lab Results   Component Value Date/Time    COVID19 Not Detected

## 2024-06-24 NOTE — PLAN OF CARE
Problem: Discharge Planning  Goal: Discharge to home or other facility with appropriate resources  Outcome: Progressing  Flowsheets  Taken 6/24/2024 0400  Discharge to home or other facility with appropriate resources:   Identify barriers to discharge with patient and caregiver   Arrange for needed discharge resources and transportation as appropriate   Identify discharge learning needs (meds, wound care, etc)   Refer to discharge planning if patient needs post-hospital services based on physician order or complex needs related to functional status, cognitive ability or social support system  Taken 6/24/2024 0000  Discharge to home or other facility with appropriate resources:   Identify barriers to discharge with patient and caregiver   Arrange for needed discharge resources and transportation as appropriate   Identify discharge learning needs (meds, wound care, etc)   Refer to discharge planning if patient needs post-hospital services based on physician order or complex needs related to functional status, cognitive ability or social support system  Taken 6/23/2024 2010  Discharge to home or other facility with appropriate resources:   Identify barriers to discharge with patient and caregiver   Arrange for needed discharge resources and transportation as appropriate   Identify discharge learning needs (meds, wound care, etc)   Refer to discharge planning if patient needs post-hospital services based on physician order or complex needs related to functional status, cognitive ability or social support system     Problem: Safety - Adult  Goal: Free from fall injury  Outcome: Progressing     Problem: Skin/Tissue Integrity - Adult  Goal: Skin integrity remains intact  Outcome: Progressing  Flowsheets  Taken 6/24/2024 0400  Skin Integrity Remains Intact: Monitor for areas of redness and/or skin breakdown  Taken 6/24/2024 0005  Skin Integrity Remains Intact: Monitor for areas of redness and/or skin breakdown  Taken

## 2024-06-24 NOTE — ANESTHESIA POSTPROCEDURE EVALUATION
Department of Anesthesiology  Postprocedure Note    Patient: Junior Soto  MRN: [id number]  YOB: 1940  Date of evaluation: 6/24/2024    Procedure Summary       Date: 06/24/24 Room / Location: [facility] PROCEDURE ROOM / [facility]    Anesthesia Start: 1452 Anesthesia Stop: 1509    Procedure: ESOPHAGOGASTRODUODENOSCOPY Diagnosis:       Melena      (Melena [K92.1])    Surgeons: Dillon Montalvo MD Responsible Provider: Naomie Mancilla MD    Anesthesia Type: MAC ASA Status: 3            Anesthesia Type: No value filed.    Deloris Phase I: Deloris Score: 10    Deloris Phase II:      Anesthesia Post Evaluation    Patient location during evaluation: PACU  Patient participation: complete - patient participated  Level of consciousness: awake and alert  Airway patency: patent  Nausea & Vomiting: no nausea and no vomiting  Cardiovascular status: blood pressure returned to baseline  Respiratory status: acceptable and room air  Hydration status: euvolemic  Pain management: adequate and satisfactory to patient    No notable events documented.   Pt vomiting; pt turned on his side and suctioned by provider.      Mike Caldwell RN  12/11/23 1500

## 2024-06-24 NOTE — PLAN OF CARE
Problem: Safety - Adult  Goal: Free from fall injury  6/24/2024 1222 by Sheri Broderick RN  Outcome: Progressing  6/24/2024 0643 by Eunice Rao RN  Outcome: Progressing   Patient assessed for fall risk; fall precautions initiated. Patient and family instructed about safety devices. Environment kept free of clutter and adequate lighting provided.  Bed locked and in lowest position.  Call light within reach. Will continue to monitor.    Problem: Skin/Tissue Integrity - Adult  Goal: Skin integrity remains intact  6/24/2024 1222 by Sheri Broderick RN  Outcome: Progressing  Flowsheets (Taken 6/24/2024 0800)  Skin Integrity Remains Intact:   Monitor for areas of redness and/or skin breakdown   Assess vascular access sites hourly  6/24/2024 0643 by Eunice Rao RN  Outcome: Progressing  Flowsheets  Taken 6/24/2024 0400  Skin Integrity Remains Intact: Monitor for areas of redness and/or skin breakdown  Taken 6/24/2024 0005  Skin Integrity Remains Intact: Monitor for areas of redness and/or skin breakdown  Taken 6/24/2024 0000  Skin Integrity Remains Intact: Monitor for areas of redness and/or skin breakdown  Taken 6/23/2024 2010  Skin Integrity Remains Intact: Monitor for areas of redness and/or skin breakdown   Skin assessment preformed. Pt turned every 2 hours with heals elevated off bed. [place] mattress in place. Will continue to monitor.    Problem: Infection - Adult  Goal: Absence of infection at discharge  6/24/2024 1222 by Sheri Broderick RN  Outcome: Progressing  Flowsheets (Taken 6/24/2024 0800)  Absence of infection at discharge:   Assess and monitor for signs and symptoms of infection   Monitor lab/diagnostic results   Monitor all insertion sites i.e., indwelling lines, tubes and drains   Administer medications as ordered   Instruct and encourage patient and family to use good hand hygiene technique   Identify and instruct in appropriate isolation precautions for identified infection/condition  6/24/2024 0643

## 2024-06-24 NOTE — CARE COORDINATION
Spoke with Yesica lopes [facility], they are able to accept pt; however, pt needs to be independent with straight cathing before she goes home.

## 2024-06-25 VITALS
BODY MASS INDEX: 24.07 KG/M2 | SYSTOLIC BLOOD PRESSURE: 121 MMHG | RESPIRATION RATE: 18 BRPM | HEIGHT: 64 IN | DIASTOLIC BLOOD PRESSURE: 52 MMHG | HEART RATE: 62 BPM | TEMPERATURE: 98.2 F | WEIGHT: 141 LBS | OXYGEN SATURATION: 98 %

## 2024-06-25 LAB
ANION GAP SERPL CALCULATED.3IONS-SCNC: 7 MMOL/L (ref 9–17)
BASOPHILS # BLD: 0 K/UL (ref 0–0.2)
BASOPHILS NFR BLD: 1 % (ref 0–2)
BUN SERPL-MCNC: 13 MG/DL (ref 8–23)
CALCIUM SERPL-MCNC: 8.8 MG/DL (ref 8.6–10.4)
CHLORIDE SERPL-SCNC: 98 MMOL/L (ref 98–107)
CO2 SERPL-SCNC: 24 MMOL/L (ref 20–31)
CREAT SERPL-MCNC: 0.6 MG/DL (ref 0.5–0.9)
EOSINOPHIL # BLD: 0.1 K/UL (ref 0–0.4)
EOSINOPHILS RELATIVE PERCENT: 2 % (ref 1–4)
ERYTHROCYTE [DISTWIDTH] IN BLOOD BY AUTOMATED COUNT: 17.5 % (ref 12.5–15.4)
GFR, ESTIMATED: 89 ML/MIN/1.73M2
GLUCOSE BLD-MCNC: 111 MG/DL (ref 65–105)
GLUCOSE BLD-MCNC: 112 MG/DL (ref 65–105)
GLUCOSE BLD-MCNC: 131 MG/DL (ref 65–105)
GLUCOSE SERPL-MCNC: 96 MG/DL (ref 70–99)
HCT VFR BLD AUTO: 27.8 % (ref 36–46)
HGB BLD-MCNC: 9.2 G/DL (ref 12–16)
LYMPHOCYTES NFR BLD: 1.5 K/UL (ref 1–4.8)
LYMPHOCYTES RELATIVE PERCENT: 27 % (ref 24–44)
MCH RBC QN AUTO: 26 PG (ref 26–34)
MCHC RBC AUTO-ENTMCNC: 33.1 G/DL (ref 31–37)
MCV RBC AUTO: 78.5 FL (ref 80–100)
MICROORGANISM SPEC CULT: NORMAL
MICROORGANISM SPEC CULT: NORMAL
MONOCYTES NFR BLD: 0.7 K/UL (ref 0.1–1.2)
MONOCYTES NFR BLD: 13 % (ref 2–11)
NEUTROPHILS NFR BLD: 57 % (ref 36–66)
NEUTS SEG NFR BLD: 3.1 K/UL (ref 1.8–7.7)
PLATELET # BLD AUTO: 264 K/UL (ref 140–450)
PMV BLD AUTO: 6.3 FL (ref 6–12)
POTASSIUM SERPL-SCNC: 4.4 MMOL/L (ref 3.7–5.3)
RBC # BLD AUTO: 3.54 M/UL (ref 4–5.2)
SERVICE CMNT-IMP: NORMAL
SERVICE CMNT-IMP: NORMAL
SODIUM SERPL-SCNC: 129 MMOL/L (ref 135–144)
SPECIMEN DESCRIPTION: NORMAL
SPECIMEN DESCRIPTION: NORMAL
WBC OTHER # BLD: 5.4 K/UL (ref 3.5–11)

## 2024-06-25 PROCEDURE — 6370000000 HC RX 637 (ALT 250 FOR IP)

## 2024-06-25 PROCEDURE — 6370000000 HC RX 637 (ALT 250 FOR IP): Performed by: STUDENT IN AN ORGANIZED HEALTH CARE EDUCATION/TRAINING PROGRAM

## 2024-06-25 PROCEDURE — 6370000000 HC RX 637 (ALT 250 FOR IP): Performed by: INTERNAL MEDICINE

## 2024-06-25 PROCEDURE — 97161 PT EVAL LOW COMPLEX 20 MIN: CPT

## 2024-06-25 PROCEDURE — 36415 COLL VENOUS BLD VENIPUNCTURE: CPT

## 2024-06-25 PROCEDURE — 99232 SBSQ HOSP IP/OBS MODERATE 35: CPT | Performed by: NURSE PRACTITIONER

## 2024-06-25 PROCEDURE — 85025 COMPLETE CBC W/AUTO DIFF WBC: CPT

## 2024-06-25 PROCEDURE — 51701 INSERT BLADDER CATHETER: CPT

## 2024-06-25 PROCEDURE — 51798 US URINE CAPACITY MEASURE: CPT

## 2024-06-25 PROCEDURE — 80048 BASIC METABOLIC PNL TOTAL CA: CPT

## 2024-06-25 PROCEDURE — 99238 HOSP IP/OBS DSCHRG MGMT 30/<: CPT | Performed by: STUDENT IN AN ORGANIZED HEALTH CARE EDUCATION/TRAINING PROGRAM

## 2024-06-25 PROCEDURE — 97165 OT EVAL LOW COMPLEX 30 MIN: CPT

## 2024-06-25 PROCEDURE — 2580000003 HC RX 258

## 2024-06-25 PROCEDURE — 82947 ASSAY GLUCOSE BLOOD QUANT: CPT

## 2024-06-25 RX ORDER — ALBUTEROL SULFATE 90 UG/1
2 AEROSOL, METERED RESPIRATORY (INHALATION) EVERY 4 HOURS PRN
Qty: 18 G | Refills: 0 | Status: SHIPPED | OUTPATIENT
Start: 2024-06-25

## 2024-06-25 RX ORDER — FERROUS SULFATE 325(65) MG
325 TABLET, DELAYED RELEASE (ENTERIC COATED) ORAL
Qty: 90 TABLET | Refills: 3 | Status: SHIPPED | OUTPATIENT
Start: 2024-06-26

## 2024-06-25 RX ADMIN — PANTOPRAZOLE SODIUM 40 MG: 40 TABLET, DELAYED RELEASE ORAL at 05:44

## 2024-06-25 RX ADMIN — EZETIMIBE 10 MG: 10 TABLET ORAL at 09:49

## 2024-06-25 RX ADMIN — FERROUS SULFATE TAB EC 325 MG (65 MG FE EQUIVALENT) 325 MG: 325 (65 FE) TABLET DELAYED RESPONSE at 09:48

## 2024-06-25 RX ADMIN — SODIUM CHLORIDE, PRESERVATIVE FREE 10 ML: 5 INJECTION INTRAVENOUS at 09:51

## 2024-06-25 RX ADMIN — ASPIRIN 162 MG: 81 TABLET, COATED ORAL at 09:48

## 2024-06-25 RX ADMIN — LEVOFLOXACIN 250 MG: 250 TABLET, FILM COATED ORAL at 09:48

## 2024-06-25 RX ADMIN — METOPROLOL SUCCINATE 50 MG: 50 TABLET, EXTENDED RELEASE ORAL at 09:48

## 2024-06-25 NOTE — PLAN OF CARE
Problem: Discharge Planning  Goal: Discharge to home or other facility with appropriate resources  Outcome: Completed  Flowsheets (Taken 6/25/2024 0800)  Discharge to home or other facility with appropriate resources: Identify barriers to discharge with patient and caregiver     Problem: Safety - Adult  Goal: Free from fall injury  Outcome: Completed     Problem: Skin/Tissue Integrity - Adult  Goal: Skin integrity remains intact  Outcome: Completed  Flowsheets  Taken 6/25/2024 1115  Skin Integrity Remains Intact: Monitor for areas of redness and/or skin breakdown  Taken 6/25/2024 0800  Skin Integrity Remains Intact: Monitor for areas of redness and/or skin breakdown     Problem: Infection - Adult  Goal: Absence of infection at discharge  Outcome: Completed  Flowsheets (Taken 6/25/2024 0800)  Absence of infection at discharge: Assess and monitor for signs and symptoms of infection     Problem: Pain  Goal: Verbalizes/displays adequate comfort level or baseline comfort level  Outcome: Completed     Problem: Chronic Conditions and Co-morbidities  Goal: Patient's chronic conditions and co-morbidity symptoms are monitored and maintained or improved  Outcome: Completed  Flowsheets (Taken 6/25/2024 0800)  Care Plan - Patient's Chronic Conditions and Co-Morbidity Symptoms are Monitored and Maintained or Improved: Monitor and assess patient's chronic conditions and comorbid symptoms for stability, deterioration, or improvement

## 2024-06-25 NOTE — CARE COORDINATION
CM spoke with patient at bedside to discuss transitional planning. Patient verbalizes having difficulty learning to straight cath herself. CM discussed short term SNF options and provided patient with hospital list to review. [facility]. Referral sent.                       Post Acute Facility/Agency List     Provided patient with the following list, the list includes the overall star ratings obtained from CMS per the Medicare Web site (www.Medicare.gov):     [] Long Term Acute Care Facilities  [] Acute Inpatient Rehabilitation Facilities  [x] Skilled Nursing Facilities  [] Home Care    Provided verbal instructions on how to utilize the QR Code to obtain additional detailed star ratings from www.Medicare.gov     offered to print and provide the detailed list:    []Accepted   [x]Declined    0911- CM spoke with Brunilda from [facility] and they are able to accept patient but she will need PT/OT eval.

## 2024-06-25 NOTE — PROGRESS NOTES
Pharmacy Note     Renal Dose Adjustment    Junior Soto is a 84 y.o. female. Pharmacist assessment of renally cleared medications.    Recent Labs     06/20/24 1923   BUN 17       Recent Labs     06/20/24 1923   CREATININE 0.9       Estimated Creatinine Clearance: 41 mL/min (based on SCr of 0.9 mg/dL).  Estimated CrCl using Ideal Body Weight: 41 mL/min (based on IBW 55 kg)    Height:   Ht Readings from Last 1 Encounters:   06/20/24 1.626 m (5' 4\")     Weight:  Wt Readings from Last 1 Encounters:   06/20/24 64 kg (141 lb)       The following medication dose has been adjusted based upon renal function per P&T Guidelines:             Meropenem 1000 mg every 8 hours changed to 1000 mg every 12 hours.    Florentino Lucas [facility]  6/20/2024 11:21 PM        
          Pharmacy Note     Renal Dose Adjustment    Junior Soto is a 84 y.o. female. Pharmacist assessment of renally cleared medications.    Recent Labs     06/21/24  0430 06/22/24  0428   BUN 16 13       Recent Labs     06/21/24  0430 06/22/24  0428   CREATININE 0.8 0.7       Estimated Creatinine Clearance: 53 mL/min (based on SCr of 0.7 mg/dL).      Height:   Ht Readings from Last 1 Encounters:   06/20/24 1.626 m (5' 4\")     Weight:  Wt Readings from Last 1 Encounters:   06/20/24 64 kg (141 lb)       The following medication dose has been adjusted based upon renal function per P&T Guidelines:             Meropenem 1000mg Q12 hours updated to 1000mg every 8 hours with improved renal function    Bhavik Mims,   PharmD  6/22/2024 9:35 AM      
    [facility] Residency  Inpatient Service     Progress Note  6/24/2024    2:19 PM    Name:   Junior Soto  MRN:     [id number]     Acct:      [id number]   Room:   [facility] [place] RM/NONE  IP Day:  4  Admit Date:  6/20/2024  5:46 PM    PCP:   Rajinder Leoms MD  Code Status:  Full Code    Subjective:     Overnight events: None    Examined patient at bedside and she denies chest pain, shortness of breath, lightheadedness, dizziness, headaches, dysuria, abdominal pain, suprapubic pain.  She did comment on an issue with leaking from the rectum that she occasionally has and for which she uses depends.  She is still having difficulty with attempting to perform a straight cath on herself and has tried multiple occasions.   Medications:     Allergies:    Allergies   Allergen Reactions    Iodine      States she has a shellfish allergy    Penicillins     Statins        Current Meds:   Scheduled Meds:    ferrous sulfate  325 mg Oral Daily with breakfast    insulin lispro  0-4 Units SubCUTAneous TID WC    insulin lispro  0-4 Units SubCUTAneous Nightly    levoFLOXacin  250 mg Oral Daily    sodium chloride flush  5-40 mL IntraVENous 2 times per day    [Held by provider] enoxaparin  40 mg SubCUTAneous Daily    aspirin  162 mg Oral Daily    latanoprost  1 drop Both Eyes Nightly    estradiol  2 g Vaginal Q3 Days    ezetimibe  10 mg Oral Daily    [Held by provider] lisinopril  10 mg Oral Daily    metoprolol succinate  50 mg Oral Daily    pantoprazole  40 mg Oral QAM AC     Continuous Infusions:    dextrose      sodium chloride       PRN Meds: ipratropium 0.5 mg-albuterol 2.5 mg, glucose, dextrose bolus **OR** dextrose bolus, glucagon (rDNA), dextrose, albuterol sulfate HFA, sodium chloride flush, sodium chloride, potassium chloride **OR** potassium alternative oral replacement **OR** potassium chloride, magnesium sulfate, ondansetron **OR** ondansetron, polyethylene glycol, 
    [facility] Residency  Inpatient Service     Progress Note  6/23/2024    1:09 PM    Name:   Junior Soto  MRN:     [id number]     Acct:      [id number]   Room:   323/323-01   Day:  3  Admit Date:  6/20/2024  5:46 PM    PCP:   Rajinder Lemos MD  Code Status:  Full Code    Subjective:     Overnight events: None    Patient was seen and examined this morning at bedside and appears to be in good spirits this morning.  Denies nausea, headaches, chest pains, confusion, fever, or difficulty ambulating currently.  Undergoing training for self placement of straight catheter for urinary retention.  She is starting to feel more comfortable doing so, however, Pt is still anxious about not being able to do so at home.  Patient has had decreasing blood counts with Hb down to 8.9.  Pt reported dark stools which were fecal occult positive.  Discussed family history of Colon cancer.  Reports last colonoscopy was 3 years ago, 5 polyps (including 3 \"pre-cancerous\") were removed; to follow up with GI to schedule colonoscopy.  Pt had Systolic Crescendo heart murmur and mentioned family history of cardiovascular related death in brother at 37, nephew at 40.     Medications:     Allergies:    Allergies   Allergen Reactions    Iodine      States she has a shellfish allergy    Penicillins     Statins        Current Meds:   Scheduled Meds:    ferrous sulfate  325 mg Oral Daily with breakfast    insulin lispro  0-4 Units SubCUTAneous TID WC    insulin lispro  0-4 Units SubCUTAneous Nightly    levoFLOXacin  250 mg Oral Daily    sodium chloride flush  5-40 mL IntraVENous 2 times per day    [Held by provider] enoxaparin  40 mg SubCUTAneous Daily    aspirin  162 mg Oral Daily    latanoprost  1 drop Both Eyes Nightly    estradiol  2 g Vaginal Q3 Days    ezetimibe  10 mg Oral Daily    [Held by provider] lisinopril  10 mg Oral Daily    metoprolol succinate  50 mg Oral Daily    pantoprazole  40 
    [facility] Residency  Inpatient Service     Progress Note  6/21/2024    6:21 AM    Name:   Junior Soto  MRN:     [id number]     Acct:      [id number]   Room:   323/323-01   Day:  1  Admit Date:  6/20/2024  5:46 PM    PCP:   Rajinder Lemos MD  Code Status:  Full Code    Subjective:     Overnight events: Patient was hypotensive and therefore antihypertensive medications were held.    Pt was examined at bedside and denied any chest pain, shortness of breath, lightheadedness, dizziness.  Noted that she has a murmur that has not been followed up with.  Discussed the fact that she is asymptomatic and therefore this is something that would need to be addressed outpatient.  Patient verbalized understanding and agreed.    Medications:     Allergies:    Allergies   Allergen Reactions    Iodine      States she has a shellfish allergy    Penicillins     Statins        Current Meds:   Scheduled Meds:    insulin lispro  0-4 Units SubCUTAneous TID WC    insulin lispro  0-4 Units SubCUTAneous Nightly    sodium chloride flush  5-40 mL IntraVENous 2 times per day    enoxaparin  40 mg SubCUTAneous Daily    aspirin  162 mg Oral Daily    latanoprost  1 drop Both Eyes Nightly    estradiol  2 g Vaginal Q3 Days    ezetimibe  10 mg Oral Daily    [Held by provider] lisinopril  10 mg Oral Daily    metoprolol succinate  50 mg Oral Daily    pantoprazole  40 mg Oral QAM AC    tamsulosin  0.4 mg Oral Daily    meropenem  1,000 mg IntraVENous Q12H     Continuous Infusions:    dextrose      sodium chloride       PRN Meds: albuterol sulfate HFA, ipratropium 0.5 mg-albuterol 2.5 mg, glucose, dextrose bolus **OR** dextrose bolus, glucagon (rDNA), dextrose, sodium chloride flush, sodium chloride, potassium chloride **OR** potassium alternative oral replacement **OR** potassium chloride, magnesium sulfate, ondansetron **OR** ondansetron, polyethylene glycol, acetaminophen **OR** acetaminophen, 
   GI Progress notes    6/25/2024   10:18 AM    Name:  Junior Soto  MRN:    [id number]     Acct:     [id number]   Room:  [address] [place] [address] Day: 5     Admit Date: 6/20/2024  5:46 PM  PCP: Rajinder Lemos MD    Subjective:     C/C:   Chief Complaint   Patient presents with    Fatigue     Pt has been feeling very weak and some sob for about a week.     Dysuria     Pt reports she just finished bactrim for uti, received call from urologist that she needs to see infectious disease and needs iv antibiotics.        Interval History: Status: improved.     Patient seen and examined  No acute events overnight  S/p EGD  - normal exam  Hgb stable, no signs of bleeding.    ROS:  Constitutional: negative for chills, fevers and sweats  Respiratory: negative for cough, dyspnea on exertion, hemoptysis, shortness of breath and wheezing  Cardiovascular: negative for chest pain, chest pressure/discomfort, dyspnea, lower extremity edema and palpitations  Gastrointestinal: negative for abdominal pain, constipation, diarrhea, nausea and vomiting  Neurological: negative for dizziness and headaches    Medications:     Allergies:   Allergies   Allergen Reactions    Iodine      States she has a shellfish allergy    Penicillins     Statins        Current Meds: ipratropium 0.5 mg-albuterol 2.5 mg (DUONEB) nebulizer solution 1 Dose, Once  ferrous sulfate (FE TABS 325) EC tablet 325 mg, Daily with breakfast  ipratropium 0.5 mg-albuterol 2.5 mg (DUONEB) nebulizer solution 1 Dose, Q4H PRN  insulin lispro (HUMALOG,ADMELOG) injection vial 0-4 Units, TID WC  insulin lispro (HUMALOG,ADMELOG) injection vial 0-4 Units, Nightly  glucose chewable tablet 16 g, PRN  dextrose bolus 10% 125 mL, PRN   Or  dextrose bolus 10% 250 mL, PRN  glucagon injection 1 mg, PRN  dextrose 10 % infusion, Continuous PRN  levoFLOXacin (LEVAQUIN) tablet 250 mg, Daily  albuterol sulfate HFA (PROVENTIL;VENTOLIN;PROAIR) 108 (90 Base) MCG/ACT inhaler 2 puff, Q4H PRN  sodium chloride 
  [facility]  Progress Note    Date: 6/22/2024    Hospital day :2     Impression:   Recurrent urinary tract infection secondary to an ESBL producing Klebsiella pneumoniae  Incomplete bladder emptying with a plan to teach her to self catheterize  Diabetes mellitus type 2  Nonalcoholic steatohepatitis    Recommendations   The patient has been started on intravenous antimicrobial therapy with meropenem but the organism is fluoroquinolone susceptible  The plan will be to switch the patient to levofloxacin x 7 days  From an infectious disease standpoint the patient is okay for discharge  I do understand that the plan is for her to learn to self catheterize so as to decrease the urinary retention  Office f/up in 3 weeks with Dr Martinez for infection. Please call [phone] for appointment      Chief complaint/reason for consultation:   ESBL Klebsiella pneumonia urinary tract infection    History of Present Illness:   Junior Soto is a 84 y.o.-year-old female who was initially admitted on 6/20/2024.     Junior has multiple medical problems including diabetes mellitus type 2, hypertension, hyperlipidemia nonalcoholic steatohepatitis, recurrent urinary tract infections, and anxiety.    The patient reports a longstanding history of bladder prolapse and urinary retention/incomplete bladder emptying and she reports that she was drinking a lot of water and attempt to try to get her urine to flush.  She subsequently developed some chills and reports that she was tired and this got progressively worse to the point that she was really tired and developed some shortness of breath and she called Dr. Weeks's office and did do a urinalysis and culture from a standing order that she has.    The patient reports that she ended up growing a resistant bacteria and was directed to come into the emergency room for further evaluation.  The patient was started on intravenous antimicrobial therapy with meropenem and 
  [facility]  Progress Note    Date: 6/23/2024    Hospital day :3     Impression:   Recurrent urinary tract infection secondary to an ESBL producing Klebsiella pneumoniae  Incomplete bladder emptying with a plan to teach her to self catheterize  Diabetes mellitus type 2  Nonalcoholic steatohepatitis    Recommendations   The patient has been started on intravenous antimicrobial therapy with meropenem but the organism is fluoroquinolone susceptible  The plan will be to switch the patient to levofloxacin x 7 days  From an infectious disease standpoint the patient is okay for discharge  I do understand that the plan is for her to learn to self catheterize so as to decrease the urinary retention  Office f/up in 3 weeks with Dr Martinez for infection. Please call [phone] for appointment      Chief complaint/reason for consultation:   ESBL Klebsiella pneumonia urinary tract infection    History of Present Illness:   Junior Soto is a 84 y.o.-year-old female who was initially admitted on 6/20/2024.     Junior has multiple medical problems including diabetes mellitus type 2, hypertension, hyperlipidemia nonalcoholic steatohepatitis, recurrent urinary tract infections, and anxiety.    The patient reports a longstanding history of bladder prolapse and urinary retention/incomplete bladder emptying and she reports that she was drinking a lot of water and attempt to try to get her urine to flush.  She subsequently developed some chills and reports that she was tired and this got progressively worse to the point that she was really tired and developed some shortness of breath and she called Dr. Weeks's office and did do a urinalysis and culture from a standing order that she has.    The patient reports that she ended up growing a resistant bacteria and was directed to come into the emergency room for further evaluation.  The patient was started on intravenous antimicrobial therapy with meropenem and 
  [facility]  Progress Note    Date: 6/24/2024    Hospital day :4     Impression:   Recurrent urinary tract infection secondary to an ESBL producing Klebsiella pneumoniae  Incomplete bladder emptying with a plan to teach her to self catheterize  Diabetes mellitus type 2  Nonalcoholic steatohepatitis    Recommendations   Levaquin 250 mg po daily x 7 days through 6/27/24.  Reconciled.  Ok to discharge from an ID standpoint.  Office f/up in 3 weeks with Dr Martinez for infection. Please call [phone] for appointment.  Patient learning self catherization prior to going home to decrease urinary retention.  Supportive care.  Discussed with patient.      Chief complaint/reason for consultation:   ESBL Klebsiella pneumonia urinary tract infection    History of Present Illness:   Junior Soto is a 84 y.o.-year-old female who was initially admitted on 6/20/2024.     Junior has multiple medical problems including diabetes mellitus type 2, hypertension, hyperlipidemia nonalcoholic steatohepatitis, recurrent urinary tract infections, and anxiety.    The patient reports a longstanding history of bladder prolapse and urinary retention/incomplete bladder emptying and she reports that she was drinking a lot of water and attempt to try to get her urine to flush.  She subsequently developed some chills and reports that she was tired and this got progressively worse to the point that she was really tired and developed some shortness of breath and she called Dr. Weeks's office and did do a urinalysis and culture from a standing order that she has.    The patient reports that she ended up growing a resistant bacteria and was directed to come into the emergency room for further evaluation.  The patient was started on intravenous antimicrobial therapy with meropenem and I was asked to evaluate and help with antibiotic choice    CURRENT EVALUATION :6/24/2024  Patient seen and examined in her room.  Feeling 
  [facility]  Progress Note    Date: 6/25/2024    Hospital day :5     Impression:   Recurrent urinary tract infection secondary to an ESBL producing Klebsiella pneumoniae  Incomplete bladder emptying with a plan to teach her to self catheterize  Diabetes mellitus type 2  Nonalcoholic steatohepatitis    Recommendations   Levaquin 250 mg po daily x 7 days through 6/27/24.  Reconciled.  Ok to discharge from an ID standpoint.  Office f/up in 3 weeks with Dr Martinez for infection. Please call [phone] for appointment.  Patient learning self catherization prior to going home to decrease urinary retention.  Supportive care.  Discussed with patient.      Chief complaint/reason for consultation:   ESBL Klebsiella pneumonia urinary tract infection    History of Present Illness:   Junior Soto is a 84 y.o.-year-old female who was initially admitted on 6/20/2024.     Junior has multiple medical problems including diabetes mellitus type 2, hypertension, hyperlipidemia nonalcoholic steatohepatitis, recurrent urinary tract infections, and anxiety.    The patient reports a longstanding history of bladder prolapse and urinary retention/incomplete bladder emptying and she reports that she was drinking a lot of water and attempt to try to get her urine to flush.  She subsequently developed some chills and reports that she was tired and this got progressively worse to the point that she was really tired and developed some shortness of breath and she called Dr. Weeks's office and did do a urinalysis and culture from a standing order that she has.    The patient reports that she ended up growing a resistant bacteria and was directed to come into the emergency room for further evaluation.  The patient was started on intravenous antimicrobial therapy with meropenem and I was asked to evaluate and help with antibiotic choice    CURRENT EVALUATION :6/25/2024  Patient seen and examined in her room.  Feeling 
BS result was 105 at 1635  
Dr. Ma at bedside.  Discussed teaching patient to self cath due to atrophy of bladder muscle.    Order to straight cath every six hours.  Waiting for order to be placed.  Request for female RN by patient.    
Patient attempted to straight cath self 2 x this shift, both unsuccessfully. Patient seems to have issue with findin a good position that she can visualize her urethra from, and could maybe use a smaller fr catheter.   
Patient did self cath and did well on the process but still have a hard time inserting the mcmanus to the urethra. Needs more practice. Educated the technique to widened the legs to be able to see the urethra with the left hand. Patient tolerated it well. Urine output was 325. Bladder scar before is 438 and the post void residual was zero.   
Patient have had educated and demonstrated the proper way to self straight cath. First performed the straight cath with the assistance of the writer. Patient was requested to bring mirror from home. Bladder scanned at 1135 was 511. Straight cath performed urine output was 450 with zero post void residual. Patient tolerated it well. Encouraged the patient to do the self cath after 4 pm without the writer's help.   
Patient was toileted and bladder scanned. Scan show 418 cc in bladder. Patient refusing to self cath at this time, would like to try to urinate on her own.  Will try again at 0630  
Physical Therapy  Facility/Department: [address]  Physical Therapy Initial Assessment    Name: Junior Soto  : 1940  MRN: [id number]  Date of Service: 2024    Discharge Recommendations:  No therapy recommended at discharge   PT Equipment Recommendations  Equipment Needed: No      Patient Diagnosis(es): The primary encounter diagnosis was Urinary tract infection without hematuria, site unspecified. A diagnosis of Hyponatremia was also pertinent to this visit.  Past Medical History:  has a past medical history of Acute pancreatitis without infection or necrosis, Anxiety, CAD (coronary artery disease), CKD (chronic kidney disease), Diabetes mellitus (HCC), Gastroesophageal reflux disease, Glaucoma, Hepatic cirrhosis (HCC), Hyperlipidemia, Hypertension, and Recurrent UTI.  Past Surgical History:  has a past surgical history that includes Appendectomy; Hysterectomy; Colonoscopy; Cardiac catheterization; Cholecystectomy, laparoscopic (2023); and Cholecystectomy, laparoscopic (N/A, 2023).    Assessment   Assessment: Pt presents with UTI due to ESBL Klebsiella and is in the process of learning how to self cath. She lives in a condo with her  and son where she was previously independent in ADLs and homemaking activities. At baseline, pt is independent in bed mobility, transfers, gait, and stairs. Currently, pt demonstrates independence with bed mobility, transfers, and gait. Pt demonstrates adequate safety and mobility to return to prior living arrangements. No therapy recommended during acute care stay or at discharge.  Therapy Prognosis: Good  Decision Making: Low Complexity  Requires PT Follow-Up: No  Activity Tolerance  Activity Tolerance: Patient tolerated evaluation without incident  Activity Tolerance Comments: pt tolerated the eval well with no concerns for safety or mobility     Plan   Physical Therapy Plan  General Plan: Discharge with evaluation only  Safety Devices  Type of 
Pt set up for another attempt to strait cath self. Pt was successfully able to strait cath after much encouragement and direction. Pt needed a lot of support and guidance finding appropriate anatomy and location for urinary tube placement. Continued to assist patient with different positions and ways to locate correct cath placement.   
Report called to Hilda PAN at [place]. All questions answered.   
SPIRITUAL CARE DEPARTMENT [facility]  PROGRESS NOTE    Room # 323/323-01   Name: Junior Soto            Judaism: Denominational    Reason for visit: Routine    I visited the patient.    Admit Date & Time: 6/20/2024  5:46 PM    Assessment:  Junior Soto is a 84 y.o. female in the hospital because urinary tract infection. Upon entering the room Pt was awake and calm. Pt was open to conversation with . Friendly.      Intervention:  I introduced myself and my title as  I offered space for Pt  to express feelings, needs, and concerns and provided a ministry presence.  provided support and pastoral care to Pt.  provided safe and calm place to talk about family and rishi.    Outcome:  Pt expressed concerns about health and family. Pt appreciated visit by .    Plan:  Chaplains will remain available to offer spiritual and emotional support as needed.    Electronically signed by Chaplain JAYLEN, on 6/22/2024 at 5:58 PM.  Spiritual Care Department  [facility]      06/22/24 1756   Encounter Summary   Encounter Overview/Reason Initial Encounter   Service Provided For Patient   Support System Family members   Last Encounter  06/22/24   Complexity of Encounter Moderate   Begin Time 1525   End Time  1540   Total Time Calculated 15 min   Spiritual/Emotional needs   Type Spiritual Support;Emotional Distress   Grief, Loss, and Adjustments   Type Life Adjustments;Anticipatory Grief   Assessment/Intervention/Outcome   Assessment Calm;Coping   Intervention Active listening   Outcome Comfort;Coping;Encouraged   Plan and Referrals   Plan/Referrals Continue to visit, (comment)       
Writer to bedside with PCT Vicky to assist pt with strait cath. Discussed sterile technique and set prior. Pt reports she feels comfortable with the use of the kit, sterile technique just having difficulty with visualization and placement. Asked pt what position she has been using and she reports lying in bed. Suggested sitting on edge of bed, with mirror on floor. Pt open to new suggestions and willing to try.    After being shown where cath was to be placed she made several attempts to visualize on own. Once correct location visualized pt was able to insert cath with positive urine return.   Will continue to assist with this through out the day.  
mobility  Supine to Sit: Independent  Sit to Supine: Independent  Scooting: Independent  Bed Mobility Comments: Pt did not require cues for hand placement or safety    Transfers  Sit to stand: Independent  Stand to sit: Independent    Vision  Vision: Impaired  Vision Exceptions: Wears glasses for distance (\"lower vision is bad\" due to glaucoma)  Hearing  Hearing: Within functional limits    Cognition  Overall Cognitive Status: WNL  Orientation  Overall Orientation Status: Within Normal Limits  Orientation Level: Oriented X4    Education Given To: Patient  Education Provided: Role of Therapy;Transfer Training;Equipment;Plan of Care  Education Method: Verbal  Barriers to Learning: None  Education Outcome: Verbalized understanding    Hand Dominance  Hand Dominance: Right    AM-PAC - ADL  AM-PAC Daily Activity - Inpatient   How much help is needed for putting on and taking off regular lower body clothing?: None  How much help is needed for bathing (which includes washing, rinsing, drying)?: None  How much help is needed for toileting (which includes using toilet, bedpan, or urinal)?: None  How much help is needed for putting on and taking off regular upper body clothing?: None  How much help is needed for taking care of personal grooming?: None  How much help for eating meals?: None  AM-PAC Inpatient Daily Activity Raw Score: 24  AM-PAC Inpatient ADL T-Scale Score : 57.54  ADL Inpatient CMS 0-100% Score: 0  ADL Inpatient CMS G-Code Modifier : CH    Therapy Time   Individual Concurrent Group Co-treatment   Time In 1019         Time Out 1035         Minutes 16           ALFREDO Borges     
\"PR3\"  Anti-GBM:   No results found for: \"GBMABIGG\"  Hep BsAg:       No results found for: \"HEPBSAG\"  Hep C AB:        No results found for: \"HEPCAB\"    Urinalysis/Chemistries:      Lab Results   Component Value Date/Time    NITRU NEGATIVE 06/20/2024 06:35 PM    COLORU Yellow 06/20/2024 06:35 PM    PHUR 6.5 06/20/2024 06:35 PM    PHUR 6.5 04/25/2024 01:31 PM    PHUR 6.0 08/16/2023 05:19 PM    WBCUA 20 TO 50 06/20/2024 06:35 PM    RBCUA None 06/20/2024 06:35 PM    MUCUS 1+ 08/16/2023 05:19 PM    BACTERIA MANY 06/20/2024 06:35 PM    CLARITYU clear 04/25/2024 01:31 PM    SPECGRAV 1.020 04/25/2024 01:31 PM    LEUKOCYTESUR MODERATE 06/20/2024 06:35 PM    UROBILINOGEN Normal 06/20/2024 06:35 PM    BILIRUBINUR NEGATIVE 06/20/2024 06:35 PM    BLOODU trace-intact 04/25/2024 01:31 PM    GLUCOSEU NEGATIVE 06/20/2024 06:35 PM    KETUA NEGATIVE 06/20/2024 06:35 PM     Urine Sodium:   No components found for: \"TRAVIS\"  Urine Potassium:  No results found for: \"KUR\"  Urine Chloride:    Lab Results   Component Value Date/Time    CLUR 43 06/20/2024 06:35 PM     Urine Osmolarity:   Lab Results   Component Value Date/Time    OSMOU 309 06/20/2024 06:35 PM     Urine Protein:   No components found for: \"TOTALPROTEIN\", \"URINE\"   Urine Creatinine:   No results found for: \"LABCREA\"  Urine Eosinophils:  No components found for: \"UEOS\"    Radiology:     CXR:     Assessment:     1.  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  ESBL positive UTI likely complicated from incomplete bladder emptying  3.  Atonic bladder  4.  Type 2 diabetes  5.  Hypertension on lisinopril    Plan:   1.  Discontinue IV fluids  2.  Can resume home medications  3.  Urology to decide about discharge planning as patient is unable to learn how to straight cath due to lack of resources in the hospital  4.  Can discontinue salt tablets  5.  
normal at 2.  Troponin normal range at 7.  Blood and urine cultures were obtained.      Nephrology, urology and infectious diseases were consulted and patient was started on meropenem.  Outpatient urine culture resulted positive for ESBL Klebsiella that was sensitive to levofloxacin and therefore she was switched from meropenem to levofloxacin by infectious diseases.  Inpatient urine culture shows the same culture findings.  Urology recommended intermittent straight catheterization 4 times daily to facilitate bladder emptying.  As Flomax has not been helping they recommended discontinuing it.  Nephrology has been on board due to her electrolyte derangements.  Initially she was managed with frequent electrolyte assessments and replacements however when she normalized they recommended discontinuing IV fluids, other treatments and continuing with fluid restriction at 1200 mL a day.  Attempts were made to educate the patient on how to do self-catheterization for bladder retention but there was difficulty due to limited supply within the hospital.  Will have to find a way to make sure that the patient is educated on this.  Of note in discussion patient did note that she was having issues with darker colored stools and therefore Hemoccult test was done which resulted positive.  GI was thus consulted.    Plan:     UTI with ESBL Klebsiella  Recurrent UTIs secondary to incomplete bladder emptying  Atrophic vaginitis  Levofloxacin 250 mg daily 7 doses  Continue home Flomax, Estrace vaginal cream    Bladder scan every shift  Straight cath ordered for post residual urine volume >250 mL  Consulted infectious disease, Dr. Martinez    Hyponatremia  Hyperkalemia, resolved  CKD, stage unclear   1200 mL fluid restriction per nephrology  Electrolyte panel every 6 hours, consider discontinuing if results continue to improve tomorrow  Hyperkalemia secondary to recently completed 10-day course of Bactrim  Consulted nephrology, 
straight Cath placement   Social work sorting Home care, supplies for Straight Catheter at home.   Consulted infectious disease, Dr. Martinez    Melena  GERD  GARCIA  Established with [facility] gastro[facility]ology  GI was consulted due to concern for melena, appreciate their recommended  Continue home Protonix    Hyponatremia  CKD, stage unclear   1200 mL fluid restriction per nephrology  Daily BMP  Nephrology has signed off    Wheezing, resolved  Shortness of breath, resolved  Former smoker  Breathing treatments DuoNebs every 4 hours as needed  Former smoker with 10 cigarettes a day for 15 years  Recommend discharge with albuterol inhaler  Recommend outpatient referral to pulmonology    Hypertensive kidney disease  Coronary artery disease  Diabetic diet  Patient established with [facility] cardiology  Continue aspirin 162 mg daily  Continue Toprol-XL  Hold lisinopril due to hypotension    Type 2 diabetes  Hyperlipidemia  Hold metformin  DEBBY  Continue ezetimibe  History of poor tolerance to statins     Anxiety  Continue Xanax 0.5 mg tablet nightly as needed for sleep and anxiety     Glaucoma  Continue latanoprost eyedrops        Telemetry: None  Anticoagulation: Lovenox   Dispo: TBD, from home  Diet: ADULT DIET; Regular; Low Sodium (2 gm)    Patient was seen, evaluated and discussed with Marcela Haas MD Priscilla Auguste, PGY-1   Family Medicine  6/25/2024  6:39 AM     Senior Resident Attestation:    Patient medically stable for discharge to [facility] on PO antibiotics (Levaquin 250 mg PO QD x 7 days; to end 6/27/24) today. She does not need PT/OT upon discharge. FU with Urology, Nephrology, Cardiology, GI and ID outpatient.    I have independently seen Junior Soto and discussed the assessment and plan with the intern listed above and the attending Marcela Haas MD. I have reviewed the note and have included any edits or additional information above.     Mary Osborne MD  Family Medicine Resident,

## 2024-06-25 NOTE — CARE COORDINATION
Writer spoke with pt and spouse, he is able to transport pt to [facility] and per pt, she would prefer that her  transfers her. Writer spoke with Pricila at [facility], they are able to accept pt at 6pm today.    1424 - Updated pt's nurse, Charis, about [facility] acceptance, spouse to transport, and they are able to accept pt at 6pm. Per Charis, she will let pt know about acceptance and time that [facility] is able to take her.    1756 - AVS/LAURIE, H&P, and MAR faxed to [facility].

## 2024-06-25 NOTE — CARE COORDINATION
Social work: PT/ot notes ARE in and sent to snf with juan. May be able to go to [facility] later today. Working on ok from [facility] and transport. Jessenia adamson

## 2024-06-27 NOTE — DISCHARGE SUMMARY
[facility] Residency  Inpatient Service    Discharge Summary     Patient ID: Junior Soto  :  1940   MRN: [id number]     ACCOUNT:  [id number]   Patient's PCP: Rajinder Lemos MD  Admit Date: 2024   Discharge Date: 2024  Length of Stay: 5  Code Status:  Prior  Admitting Physician: Marcela Haas MD  Discharge Physician:  Marcela Haas MD    Active Discharge Diagnoses:     Hospital Problem Lists:  Principal Problem:    Urinary tract infection due to ESBL Klebsiella  Active Problems:    Anxiety    Atrophic vaginitis    Gastroesophageal reflux disease    Glaucoma    Hyperlipidemia    Hypertensive kidney disease    Type 2 diabetes mellitus without complication, without long-term current use of insulin (HCC)    GARCIA (nonalcoholic steatohepatitis)    Former smoker    Coronary artery disease involving native coronary artery of native heart without angina pectoris    History of recurrent UTI (urinary tract infection)    Urinary tract infection without hematuria    Hyponatremia    Incomplete bladder emptying    Metabolic acidosis    Hypertension, essential    Cirrhosis of liver without ascites (HCC)    Melena    Infection due to extended-spectrum beta-lactamase-producing Klebsiella pneumoniae  Resolved Problems:    Wheezing    Hyperkalemia      Admission Condition:  fair     Discharged Condition: stable    Hospital Stay:     Hospital Course:    Junior Soto is a 84 y.o. female with a PMH recurrent UTIs, CKD, hypertension, CAD, T2DM, GARCIA, GERD, hyponatremia, anxiety, and glaucoma who presents with symptoms of UTI and was admitted to the hospital for the management of UTI due to ESBL Klebsiella.     On arrival to ER patient was hemodynamically stable and afebrile satting at 98% on room air.  CXR showed no acute pulmonary findings.  CBC, BMP, troponin and blood cultures, urinalysis were ordered.  UA was positive for moderate leukocyte esterase and was

## 2024-07-01 ENCOUNTER — HOSPITAL ENCOUNTER (OUTPATIENT)
Age: 84
Setting detail: SPECIMEN
Discharge: HOME | End: 2024-07-01
Payer: MEDICARE

## 2024-07-01 DIAGNOSIS — N39.0 RECURRENT UTI: ICD-10-CM

## 2024-07-01 PROCEDURE — 87186 SC STD MICRODIL/AGAR DIL: CPT

## 2024-07-01 PROCEDURE — 87086 URINE CULTURE/COLONY COUNT: CPT

## 2024-07-01 PROCEDURE — 87077 CULTURE AEROBIC IDENTIFY: CPT

## 2024-07-02 ENCOUNTER — TELEPHONE (OUTPATIENT)
Age: 84
End: 2024-07-02

## 2024-07-02 RX ORDER — NITROFURANTOIN 25; 75 MG/1; MG/1
100 CAPSULE ORAL 2 TIMES DAILY
Qty: 14 CAPSULE | Refills: 0 | Status: SHIPPED | OUTPATIENT
Start: 2024-07-02

## 2024-07-02 NOTE — TELEPHONE ENCOUNTER
Pt called writer and stated she dropped of a urine for culture yesterday. She is having some burning and nocturia x5. She would like something called into [place] [address] - P [phone] - F [phone] [[id number]]

## 2024-07-04 LAB
MICROORGANISM SPEC CULT: ABNORMAL
SERVICE CMNT-IMP: ABNORMAL
SPECIMEN DESCRIPTION: ABNORMAL

## 2024-07-08 DIAGNOSIS — R33.9 URINARY RETENTION: Primary | ICD-10-CM

## 2024-07-08 RX ORDER — ULTRASOUND COUPLING MEDIUM
GEL (GRAM) TOPICAL
Qty: 120 EACH | Refills: 11 | Status: SHIPPED | OUTPATIENT
Start: 2024-07-08 | End: 2024-07-18

## 2024-07-15 ENCOUNTER — HOSPITAL ENCOUNTER (OUTPATIENT)
Age: 84
Setting detail: SPECIMEN
Discharge: HOME | End: 2024-07-15
Payer: MEDICARE

## 2024-07-15 DIAGNOSIS — N39.0 RECURRENT UTI: ICD-10-CM

## 2024-07-15 PROCEDURE — 87077 CULTURE AEROBIC IDENTIFY: CPT

## 2024-07-15 PROCEDURE — 87186 SC STD MICRODIL/AGAR DIL: CPT

## 2024-07-15 PROCEDURE — 87086 URINE CULTURE/COLONY COUNT: CPT

## 2024-07-18 ENCOUNTER — TELEPHONE (OUTPATIENT)
Dept: UROLOGY | Age: 84
End: 2024-07-18

## 2024-07-18 LAB
MICROORGANISM SPEC CULT: ABNORMAL
SERVICE CMNT-IMP: ABNORMAL
SPECIMEN DESCRIPTION: ABNORMAL

## 2024-07-18 NOTE — TELEPHONE ENCOUNTER
Patient called with concerns regarding her UTI. Patient was referred to Dr. Donaldson's group back in June for treatment of Klebsiella d/t bacteria not susceptible to any oral antibiotics. Patient stated she had an appointment with ID, but could not get in till August, and wanted a return call regarding her UTI not being treated. This nurse contacted Dr. Donaldson's office to see if they could get patient in any sooner, and was informed that they contacted the patient immediately after receiving the referral, and that patient declined to make an appointment with them. This nurse attempted to contact patient, with no answer. Voicemail left for patient, that Dr. Gallardo cannot treat her UTI as it does not respond to oral ATB, and that she is to contact Dr. Donaldson's office to schedule appointment with them ASAP, also instructed her that if she is feeling ill or is running a fever, she is to go to the ER for further evaluation and treatment.

## 2024-07-23 ENCOUNTER — OFFICE VISIT (OUTPATIENT)
Dept: INFECTIOUS DISEASES | Age: 84
End: 2024-07-23
Payer: MEDICARE

## 2024-07-23 VITALS
BODY MASS INDEX: 24.75 KG/M2 | TEMPERATURE: 97 F | OXYGEN SATURATION: 98 % | DIASTOLIC BLOOD PRESSURE: 62 MMHG | RESPIRATION RATE: 13 BRPM | HEART RATE: 59 BPM | SYSTOLIC BLOOD PRESSURE: 125 MMHG | HEIGHT: 64 IN | WEIGHT: 145 LBS

## 2024-07-23 DIAGNOSIS — B96.89 URINARY TRACT INFECTION DUE TO ESBL KLEBSIELLA: Primary | ICD-10-CM

## 2024-07-23 DIAGNOSIS — N39.0 URINARY TRACT INFECTION DUE TO ESBL KLEBSIELLA: Primary | ICD-10-CM

## 2024-07-23 PROCEDURE — 1090F PRES/ABSN URINE INCON ASSESS: CPT | Performed by: INTERNAL MEDICINE

## 2024-07-23 PROCEDURE — 1036F TOBACCO NON-USER: CPT | Performed by: INTERNAL MEDICINE

## 2024-07-23 PROCEDURE — 1123F ACP DISCUSS/DSCN MKR DOCD: CPT | Performed by: INTERNAL MEDICINE

## 2024-07-23 PROCEDURE — 99214 OFFICE O/P EST MOD 30 MIN: CPT | Performed by: INTERNAL MEDICINE

## 2024-07-23 PROCEDURE — G8427 DOCREV CUR MEDS BY ELIG CLIN: HCPCS | Performed by: INTERNAL MEDICINE

## 2024-07-23 PROCEDURE — 3078F DIAST BP <80 MM HG: CPT | Performed by: INTERNAL MEDICINE

## 2024-07-23 PROCEDURE — G8420 CALC BMI NORM PARAMETERS: HCPCS | Performed by: INTERNAL MEDICINE

## 2024-07-23 PROCEDURE — 1111F DSCHRG MED/CURRENT MED MERGE: CPT | Performed by: INTERNAL MEDICINE

## 2024-07-23 PROCEDURE — 3074F SYST BP LT 130 MM HG: CPT | Performed by: INTERNAL MEDICINE

## 2024-07-23 PROCEDURE — G8400 PT W/DXA NO RESULTS DOC: HCPCS | Performed by: INTERNAL MEDICINE

## 2024-07-23 RX ORDER — GRANULES FOR ORAL 3 G/1
3 POWDER ORAL
Qty: 2 EACH | Refills: 0 | Status: SHIPPED | OUTPATIENT
Start: 2024-07-23 | End: 2024-07-23 | Stop reason: CLARIF

## 2024-07-23 RX ORDER — CLOBETASOL PROPIONATE 0.5 MG/G
CREAM TOPICAL
COMMUNITY

## 2024-07-23 RX ORDER — LEVOFLOXACIN 500 MG/1
500 TABLET, FILM COATED ORAL DAILY
Qty: 10 TABLET | Refills: 0 | Status: SHIPPED | OUTPATIENT
Start: 2024-07-23 | End: 2024-08-02

## 2024-07-23 ASSESSMENT — ENCOUNTER SYMPTOMS
GASTROINTESTINAL NEGATIVE: 1
RESPIRATORY NEGATIVE: 1

## 2024-07-23 NOTE — PROGRESS NOTES
\"SEDRATE\"      Imaging Studies:   RIGHT UPPER QUADRANT ULTRASOUND 6/24/2024 7:07 am   IMPRESSION:  1. Cirrhotic liver. No definite hepatic mass identified.  2. Status post cholecystectomy. Mildly enlarged common bile duct measuring  9.7 mm in diameter, which may be secondary to reservoir phenomenon.              Specimen Collected: 06/24/24 13:54 EDT Last Resulted: 06/24/24 13:59 EDT           Cultures:   Culture and Sensitivities:  No results for input(s): \"SPECDESC\", \"SPECIAL\", \"CULTURE\", \"STATUS\", \"ORG\", \"CDIFFTOXPCR\", \"CAMPYLOBPCR\", \"SALMONELLAPC\", \"SHIGAPCR\", \"SHIGELLAPCR\" in the last 72 hours.    Component  Resulting Agency   Specimen Description .URINE [facility]   Special Requests Site: Urine [facility]   Culture  Abnormal   KLEBSIELLA PNEUMONIAE >100,000 CFU/ML This organism is an Extended Spectrum Beta Lactamase (ESBL)  and resistance to therapy with penicillins, cephalosporins and aztreonam is expected. These organisms generally remain susceptible to carbapenems. Consider ID Consultation. CONTACT PRECAUTIONS INDICATED.  [facility]        Susceptibility    Klebsiella pneumoniae (1)    Antibiotic Interpretation Microscan Method Status    ampicillin Resistant >=32 BACTERIAL SUSCEPTIBILITY PANEL KWAN Final    ceFAZolin Resistant >=64 BACTERIAL SUSCEPTIBILITY PANEL KWAN Final     Cefazolin sensitivity results can be used to predict the effectiveness of oral  cephalosporins (eg. Cephalexin) in uncomplicated Urinary Tract Infections due to E. coli, K.   pneumoniae, and P. mirabilis       cefepime Resistant >=32 BACTERIAL SUSCEPTIBILITY PANEL KWAN Final    cefTRIAXone Resistant >=64 BACTERIAL SUSCEPTIBILITY PANEL KWAN Final    Confirmatory Extended Spectrum Beta-Lactamase Resistant POSITIVE BACTERIAL SUSCEPTIBILITY PANEL KWAN Final    gentamicin Sensitive <=1 BACTERIAL SUSCEPTIBILITY PANEL KWAN Final    imipenem Sensitive <=0.25 BACTERIAL SUSCEPTIBILITY PANEL KWAN Final    levofloxacin

## 2024-08-13 ENCOUNTER — HOSPITAL ENCOUNTER (OUTPATIENT)
Age: 84
Setting detail: SPECIMEN
Discharge: HOME | End: 2024-08-13
Payer: MEDICARE

## 2024-08-13 DIAGNOSIS — N39.0 RECURRENT UTI: ICD-10-CM

## 2024-08-13 PROCEDURE — 87077 CULTURE AEROBIC IDENTIFY: CPT

## 2024-08-13 PROCEDURE — 87086 URINE CULTURE/COLONY COUNT: CPT

## 2024-08-13 PROCEDURE — 87186 SC STD MICRODIL/AGAR DIL: CPT

## 2024-08-15 LAB
MICROORGANISM SPEC CULT: ABNORMAL
SERVICE CMNT-IMP: ABNORMAL
SPECIMEN DESCRIPTION: ABNORMAL

## 2024-08-20 ENCOUNTER — OFFICE VISIT (OUTPATIENT)
Dept: PODIATRY | Age: 84
End: 2024-08-20
Payer: MEDICARE

## 2024-08-20 VITALS — HEIGHT: 64 IN | WEIGHT: 149 LBS | BODY MASS INDEX: 25.44 KG/M2

## 2024-08-20 DIAGNOSIS — M20.42 HAMMER TOES OF BOTH FEET: ICD-10-CM

## 2024-08-20 DIAGNOSIS — M21.611 BILATERAL BUNIONS: Primary | ICD-10-CM

## 2024-08-20 DIAGNOSIS — M21.612 BILATERAL BUNIONS: Primary | ICD-10-CM

## 2024-08-20 DIAGNOSIS — M79.604 PAIN IN BOTH LOWER EXTREMITIES: ICD-10-CM

## 2024-08-20 DIAGNOSIS — M79.605 PAIN IN BOTH LOWER EXTREMITIES: ICD-10-CM

## 2024-08-20 DIAGNOSIS — M20.41 HAMMER TOES OF BOTH FEET: ICD-10-CM

## 2024-08-20 PROCEDURE — 1090F PRES/ABSN URINE INCON ASSESS: CPT | Performed by: PODIATRIST

## 2024-08-20 PROCEDURE — G8419 CALC BMI OUT NRM PARAM NOF/U: HCPCS | Performed by: PODIATRIST

## 2024-08-20 PROCEDURE — 1123F ACP DISCUSS/DSCN MKR DOCD: CPT | Performed by: PODIATRIST

## 2024-08-20 PROCEDURE — G8400 PT W/DXA NO RESULTS DOC: HCPCS | Performed by: PODIATRIST

## 2024-08-20 PROCEDURE — 1036F TOBACCO NON-USER: CPT | Performed by: PODIATRIST

## 2024-08-20 PROCEDURE — 99204 OFFICE O/P NEW MOD 45 MIN: CPT | Performed by: PODIATRIST

## 2024-08-20 PROCEDURE — G8427 DOCREV CUR MEDS BY ELIG CLIN: HCPCS | Performed by: PODIATRIST

## 2024-08-20 NOTE — PROGRESS NOTES
[facility] PHYSICIANS [facility] PODIATRY  [address] [place], [place] [id number]  Dept: [phone]    NEW PATIENT PROGRESS NOTE  Date of patient's visit: 8/20/2024  Patient's Name:  Junior Soto YOB: 1940            Patient Care Team:  Rajinder Lemos MD as PCP - General (Family Medicine)  Silvina Martinez MD as Consulting Physician (Infectious Diseases)        Chief Complaint   Patient presents with    New Patient     Establish care    Bunions     Right foot    Diabetes    Hammer Toe     2nd toe bl feet right more severe         HPI:   Junior Soto is a 84 y.o. female who presents to the office today complaining of bunion of the right foot and hammertoes 2nd toes with the right being more severe.  Symptoms began several year(s) ago. Patient relates pain is Present.  Pain is rated 5 out of 10 and is described as intermittent.  Treatments prior to today's visit include: toe spacers.  Currently denies F/C/N/V. Pt's primary care physician is Rajinder Lemos MD last seen July 30 2024. Patient states within the last year, her bunion has been more severe.      Allergies   Allergen Reactions    Iodine      States she has a shellfish allergy    Penicillins     Statins        Past Medical History:   Diagnosis Date    Acute pancreatitis without infection or necrosis 8/15/2023    Anxiety     CAD (coronary artery disease)     CKD (chronic kidney disease)     Diabetes mellitus (HCC)     Gastroesophageal reflux disease     Glaucoma     Hepatic cirrhosis (HCC)     Hyperlipidemia     Hypertension     Recurrent UTI        Prior to Admission medications    Medication Sig Start Date End Date Taking? Authorizing Provider   clobetasol (TEMOVATE) 0.05 % cream APPLY CREAM TOPICALLY TWICE DAILY FOR 2 WEEKS TO PSORIASIS THEN APPLY TWO TIMES A WEEK AS NEEDED.   Yes Provider, MD Leah   albuterol sulfate HFA (PROVENTIL;VENTOLIN;PROAIR) 108 (90 Base) MCG/ACT

## 2024-08-21 ENCOUNTER — TELEPHONE (OUTPATIENT)
Dept: INFECTIOUS DISEASES | Age: 84
End: 2024-08-21

## 2024-08-21 NOTE — TELEPHONE ENCOUNTER
Patient had a urine culture done on 8/13/24 with a standing order from urology and it looks like they wanted you to review and treat but it does not appear they ever routed it to you.  Patient stated that she is having a lot of burning with urination even though she is cathing herself like she is supposed to and she has been getting headaches.  Please review culture and advise.  I did verify pharmacy in patients chart.

## 2024-08-21 NOTE — TELEPHONE ENCOUNTER
Patient called on this again. Appears Dr Gallardo was aware of the results on 8/15. 8/19 a nurse in his office states that she was going to notify us but never did. Patient states was told twice to call our office but only just called this morning.     Patient complains of waking up today with a headache and sweating.     Patient was informed that if she is feeling bad then go to urgent care or the ER if she doesn't hear back from us.

## 2024-08-22 DIAGNOSIS — N30.01 ACUTE CYSTITIS WITH HEMATURIA: ICD-10-CM

## 2024-08-22 DIAGNOSIS — B96.89 URINARY TRACT INFECTION DUE TO ESBL KLEBSIELLA: Primary | ICD-10-CM

## 2024-08-22 DIAGNOSIS — N39.0 URINARY TRACT INFECTION DUE TO ESBL KLEBSIELLA: Primary | ICD-10-CM

## 2024-08-22 RX ORDER — CIPROFLOXACIN 500 MG/1
500 TABLET, FILM COATED ORAL 2 TIMES DAILY
Qty: 20 TABLET | Refills: 0 | Status: SHIPPED | OUTPATIENT
Start: 2024-08-22 | End: 2024-09-01

## 2024-09-09 ENCOUNTER — HOSPITAL ENCOUNTER (OUTPATIENT)
Age: 84
Setting detail: SPECIMEN
Discharge: HOME | End: 2024-09-09
Payer: MEDICARE

## 2024-09-09 DIAGNOSIS — N39.0 RECURRENT UTI: ICD-10-CM

## 2024-09-09 PROCEDURE — 87186 SC STD MICRODIL/AGAR DIL: CPT

## 2024-09-09 PROCEDURE — 87086 URINE CULTURE/COLONY COUNT: CPT

## 2024-09-09 PROCEDURE — 87077 CULTURE AEROBIC IDENTIFY: CPT

## 2024-09-11 ENCOUNTER — TELEPHONE (OUTPATIENT)
Dept: INFECTIOUS DISEASES | Age: 84
End: 2024-09-11

## 2024-09-12 LAB
MICROORGANISM SPEC CULT: ABNORMAL
SERVICE CMNT-IMP: ABNORMAL
SPECIMEN DESCRIPTION: ABNORMAL

## 2024-09-12 RX ORDER — GRANULES FOR ORAL 3 G/1
3 POWDER ORAL
Qty: 1 EACH | Refills: 0 | Status: SHIPPED | OUTPATIENT
Start: 2024-09-12 | End: 2024-09-16

## 2024-09-16 ENCOUNTER — TELEPHONE (OUTPATIENT)
Dept: INFECTIOUS DISEASES | Age: 84
End: 2024-09-16

## 2024-10-07 ENCOUNTER — HOSPITAL ENCOUNTER (OUTPATIENT)
Age: 84
Setting detail: SPECIMEN
Discharge: HOME | End: 2024-10-07
Payer: MEDICARE

## 2024-10-07 DIAGNOSIS — N39.0 RECURRENT UTI: ICD-10-CM

## 2024-10-07 PROCEDURE — 87077 CULTURE AEROBIC IDENTIFY: CPT

## 2024-10-07 PROCEDURE — 87086 URINE CULTURE/COLONY COUNT: CPT

## 2024-10-07 PROCEDURE — 87186 SC STD MICRODIL/AGAR DIL: CPT

## 2024-10-09 LAB
MICROORGANISM SPEC CULT: ABNORMAL
SERVICE CMNT-IMP: ABNORMAL
SPECIMEN DESCRIPTION: ABNORMAL

## 2024-10-11 ENCOUNTER — TELEPHONE (OUTPATIENT)
Dept: INFECTIOUS DISEASES | Age: 84
End: 2024-10-11

## 2024-10-11 NOTE — TELEPHONE ENCOUNTER
Patient called and asked if you can please look at her urine culture and she would also like you to put in a standing order so she knows the results are getting to you.  Patient stated that the only symptom she is having is trouble urinating.  Patient stated that she is cathing 3 to 4 times daily and she just knows she has a UTI.  Please advise

## 2024-10-14 NOTE — TELEPHONE ENCOUNTER
I asked patient about other symptoms and the only symptom that she stated she had is trouble urinating.  I LM for patient to call office back to see if she is having any other symptoms.

## 2024-10-14 NOTE — TELEPHONE ENCOUNTER
Spoke with patient and she stated again that she is having no other symptoms other than trouble urinating and when she has trouble like this she always has a UTI.  I did explain to patient that without any other symptoms that you may not treat just because her urine and culture may have not come back good and she may be colonized.

## 2024-11-05 ENCOUNTER — OFFICE VISIT (OUTPATIENT)
Dept: INFECTIOUS DISEASES | Age: 84
End: 2024-11-05
Payer: MEDICARE

## 2024-11-05 VITALS
BODY MASS INDEX: 25.1 KG/M2 | WEIGHT: 147 LBS | TEMPERATURE: 97 F | HEART RATE: 58 BPM | RESPIRATION RATE: 14 BRPM | DIASTOLIC BLOOD PRESSURE: 70 MMHG | HEIGHT: 64 IN | OXYGEN SATURATION: 99 % | SYSTOLIC BLOOD PRESSURE: 145 MMHG

## 2024-11-05 DIAGNOSIS — Z87.440 HISTORY OF RECURRENT UTI (URINARY TRACT INFECTION): Primary | ICD-10-CM

## 2024-11-05 DIAGNOSIS — Z86.19 HISTORY OF ESBL KLEBSIELLA PNEUMONIAE INFECTION: ICD-10-CM

## 2024-11-05 PROCEDURE — 1123F ACP DISCUSS/DSCN MKR DOCD: CPT | Performed by: INTERNAL MEDICINE

## 2024-11-05 PROCEDURE — 1036F TOBACCO NON-USER: CPT | Performed by: INTERNAL MEDICINE

## 2024-11-05 PROCEDURE — G8484 FLU IMMUNIZE NO ADMIN: HCPCS | Performed by: INTERNAL MEDICINE

## 2024-11-05 PROCEDURE — 99213 OFFICE O/P EST LOW 20 MIN: CPT | Performed by: INTERNAL MEDICINE

## 2024-11-05 PROCEDURE — 1090F PRES/ABSN URINE INCON ASSESS: CPT | Performed by: INTERNAL MEDICINE

## 2024-11-05 PROCEDURE — 3078F DIAST BP <80 MM HG: CPT | Performed by: INTERNAL MEDICINE

## 2024-11-05 PROCEDURE — G8400 PT W/DXA NO RESULTS DOC: HCPCS | Performed by: INTERNAL MEDICINE

## 2024-11-05 PROCEDURE — 1159F MED LIST DOCD IN RCRD: CPT | Performed by: INTERNAL MEDICINE

## 2024-11-05 PROCEDURE — 3077F SYST BP >= 140 MM HG: CPT | Performed by: INTERNAL MEDICINE

## 2024-11-05 PROCEDURE — G8419 CALC BMI OUT NRM PARAM NOF/U: HCPCS | Performed by: INTERNAL MEDICINE

## 2024-11-05 PROCEDURE — G8427 DOCREV CUR MEDS BY ELIG CLIN: HCPCS | Performed by: INTERNAL MEDICINE

## 2024-11-05 ASSESSMENT — ENCOUNTER SYMPTOMS
GASTROINTESTINAL NEGATIVE: 1
RESPIRATORY NEGATIVE: 1

## 2024-11-05 NOTE — PROGRESS NOTES
[facility]  Office Progress Note  Today's Date and Time: 11/5/2024, 2:37 PM    Impression:     1. History of recurrent UTI (urinary tract infection)    2. History of ESBL Klebsiella pneumoniae infection         Recommendations   The patient has incomplete bladder emptying which was the major risk factor for the urinary tract infections.  She is well-known to be colonized with ESBL Klebsiella pneumonia.  The patient has started self catheterizing and is doing very well with his and though she does have some sediment mostly in the morning the urine tends to clear.  She also has made a point to keep up with oral/fluid intake  We discussed that she does not need to have standing order for urine test to be done monthly but only needs to be tested when she is symptomatic.  Again she will likely grow out the ESBL E. coli knowing that she is colonized with this organism.  Again in the event that she does develop a UTI the options would be for the fluoroquinolone therapy orally, fosfomycin orally.  If these do not work then she will need intravenous antibiotic therapy    I have ordered the following medications/ labs:  No orders of the defined types were placed in this encounter.     No orders of the defined types were placed in this encounter.      Chief complaint/reason for consultation:     Chief Complaint   Patient presents with    Urinary Tract Infection     Follow up        History of Present Illness:   Junior Soto is a 84 y.o.-year-old female who I am seeing in follow-up for recurrent urinary tract infections.    Junior has multiple medical issues including anxiety, chronic kidney disease,  Diabetes mellitus type 2, controlled (CMS-HCC), Glaucoma, Hepatic cirrhosis (CMS-HCC), Hyperlipidemia, Hypertension, Liver cirrhosis (CMS-HCC), Psoriasis, recurrent urinary tract infection, and Visual impairment.     She has a history of incomplete bladder emptying and is known to be colonized with ESBL

## 2024-11-18 ENCOUNTER — HOSPITAL ENCOUNTER (OUTPATIENT)
Age: 84
Setting detail: SPECIMEN
Discharge: HOME | End: 2024-11-18
Payer: MEDICARE

## 2024-11-18 DIAGNOSIS — N39.0 RECURRENT UTI: ICD-10-CM

## 2024-11-18 PROCEDURE — 87186 SC STD MICRODIL/AGAR DIL: CPT

## 2024-11-18 PROCEDURE — 87086 URINE CULTURE/COLONY COUNT: CPT

## 2024-11-18 PROCEDURE — 87077 CULTURE AEROBIC IDENTIFY: CPT

## 2024-11-20 LAB
MICROORGANISM SPEC CULT: ABNORMAL
SERVICE CMNT-IMP: ABNORMAL
SPECIMEN DESCRIPTION: ABNORMAL

## 2024-11-22 NOTE — RESULT ENCOUNTER NOTE
The patient was recently seen in the office and clinically was doing well and did not have any symptoms and therefore the organism does not need to be treated at this time

## 2025-01-23 ENCOUNTER — OFFICE VISIT (OUTPATIENT)
Dept: UROLOGY | Age: 85
End: 2025-01-23
Payer: MEDICARE

## 2025-01-23 VITALS — BODY MASS INDEX: 25.1 KG/M2 | WEIGHT: 147 LBS | HEIGHT: 64 IN

## 2025-01-23 DIAGNOSIS — N39.0 RECURRENT UTI: ICD-10-CM

## 2025-01-23 DIAGNOSIS — R33.8 OTHER RETENTION OF URINE: Primary | ICD-10-CM

## 2025-01-23 LAB
BILIRUBIN, POC: NEGATIVE
BLOOD URINE, POC: ABNORMAL
CLARITY, POC: CLEAR
COLOR, POC: YELLOW
GLUCOSE URINE, POC: NEGATIVE MG/DL
KETONES, POC: NEGATIVE MG/DL
LEUKOCYTE EST, POC: ABNORMAL
NITRITE, POC: NEGATIVE
PH, POC: 7
PROTEIN, POC: NEGATIVE MG/DL
SPECIFIC GRAVITY, POC: 1.02
UROBILINOGEN, POC: 0.2 MG/DL

## 2025-01-23 PROCEDURE — G8400 PT W/DXA NO RESULTS DOC: HCPCS | Performed by: SPECIALIST

## 2025-01-23 PROCEDURE — 1090F PRES/ABSN URINE INCON ASSESS: CPT | Performed by: SPECIALIST

## 2025-01-23 PROCEDURE — 51798 US URINE CAPACITY MEASURE: CPT | Performed by: SPECIALIST

## 2025-01-23 PROCEDURE — 1036F TOBACCO NON-USER: CPT | Performed by: SPECIALIST

## 2025-01-23 PROCEDURE — 1123F ACP DISCUSS/DSCN MKR DOCD: CPT | Performed by: SPECIALIST

## 2025-01-23 PROCEDURE — G8427 DOCREV CUR MEDS BY ELIG CLIN: HCPCS | Performed by: SPECIALIST

## 2025-01-23 PROCEDURE — 81002 URINALYSIS NONAUTO W/O SCOPE: CPT | Performed by: SPECIALIST

## 2025-01-23 PROCEDURE — 1159F MED LIST DOCD IN RCRD: CPT | Performed by: SPECIALIST

## 2025-01-23 PROCEDURE — 99214 OFFICE O/P EST MOD 30 MIN: CPT | Performed by: SPECIALIST

## 2025-01-23 PROCEDURE — G8419 CALC BMI OUT NRM PARAM NOF/U: HCPCS | Performed by: SPECIALIST

## 2025-01-23 RX ORDER — TAMSULOSIN HYDROCHLORIDE 0.4 MG/1
0.4 CAPSULE ORAL DAILY
Qty: 30 CAPSULE | Refills: 5 | Status: SHIPPED | OUTPATIENT
Start: 2025-01-23

## 2025-01-23 NOTE — PROGRESS NOTES
Eddie Gallardo MD, FACS  [facility] Established Patient office note    Patient:  Junior Soto  YOB: 1940  Date: 1/23/2025    HISTORY OF PRESENT ILLNESS:   The patient is a 84 y.o. female  The patient presents with recurrent UTI's due to chronic incomplete bladder emptying.  Patient has been doing intermittent straight catheterization 3-4 times a day.  Patient told to measure cath volumes and try to keep under 400-500 mL the majority of the time.  If cath volumes are consistently higher than this, she will need to reduce her fluid intake or increase the frequency of catheterization.  Patient does seem to void better on Flomax/tamsulosin 0.4 mg po qd and thus, will continue this for now.   Will also begin Gentamicin irrigations 30 mL twice a week (480 mg Gentamicin in 1 L of 0.9% NS) to prevent UTI's.    Today's AUA Symptom Score (QOL): 23 (3)    Summary of old records:   Recurrent UTI's: deong jose miguel Giles ([facility]); 8/15/23 CT AP wo-left renal cyst 3.1 cm, left lower pole KS; begin Gentamicin irrigations 30 mL twice a week (480 mg Gentamicin in 1 L of 0.9% NS) 1/23/25 (ID=Chinyadza)  Incomplete bladder emptying: PVR = 333 mL; Rx for Flomax/tamsulosin 0.4 mg po qd        Additional History: none    Procedures Today: N/A    Urinalysis today:  Results for orders placed or performed in visit on 01/23/25   POCT Urinalysis Dipstick no Micro   Result Value Ref Range    Color, UA Yellow     Clarity, UA Clear     Glucose, UA POC Negative mg/dL    Bilirubin, UA Negative     Ketones, UA Negative mg/dL    Spec Grav, UA 1.020     Blood, UA POC Trace-Intact (A)     pH, UA 7.0     Protein, UA POC Negative mg/dL    Urobilinogen, UA 0.2 mg/dL    Leukocytes, UA Small (A)     Nitrite, UA Negative        Last BUN and creatinine:  Lab Results   Component Value Date    BUN 13 06/25/2024     Lab Results   Component Value Date    CREATININE 0.6 06/25/2024       Last CBC:  Lab Results   Component Value Date

## 2025-02-17 ENCOUNTER — TELEPHONE (OUTPATIENT)
Age: 85
End: 2025-02-17

## 2025-02-17 NOTE — TELEPHONE ENCOUNTER
PT (CLINIC PT- SAID THAT YOU GAVE HER THIS NUMBER TO CALL) SAID THAT SHE HAS DONE GENTAMICIN IRRIGATIONS AND HAS ONE LEFT AND EVERYTHING SEEMS TO BE GOOD- SHE IS WONDERING IF SHE SHOULD CONTINUE WITH THESE-PLEASE ADVISE-RENATA

## 2025-02-27 ENCOUNTER — TELEPHONE (OUTPATIENT)
Dept: UROLOGY | Age: 85
End: 2025-02-27

## 2025-02-27 ENCOUNTER — HOSPITAL ENCOUNTER (OUTPATIENT)
Age: 85
Setting detail: SPECIMEN
Discharge: HOME | End: 2025-02-27
Payer: MEDICARE

## 2025-02-27 DIAGNOSIS — N39.0 RECURRENT UTI: ICD-10-CM

## 2025-02-27 PROCEDURE — 87186 SC STD MICRODIL/AGAR DIL: CPT

## 2025-02-27 PROCEDURE — 87086 URINE CULTURE/COLONY COUNT: CPT

## 2025-02-27 PROCEDURE — 87077 CULTURE AEROBIC IDENTIFY: CPT

## 2025-02-27 NOTE — TELEPHONE ENCOUNTER
Patient called this AM with multiple concerns. States she irrigated her bladder last night, and when she drained it this AM, the urine looked \"pus-filled\" so she dropped off a specimen for culture. She states she wakes up sweaty through the night, and that she's been having more trouble urinating. Patient wants to know if you want her to do the Gentamicin irrigations more frequently. Please advise.

## 2025-02-28 LAB
MICROORGANISM SPEC CULT: ABNORMAL
SERVICE CMNT-IMP: ABNORMAL
SPECIMEN DESCRIPTION: ABNORMAL

## 2025-03-03 RX ORDER — LEVOFLOXACIN 500 MG/1
500 TABLET, FILM COATED ORAL DAILY
Qty: 7 TABLET | Refills: 0 | Status: SHIPPED | OUTPATIENT
Start: 2025-03-03 | End: 2025-03-10

## 2025-05-13 ENCOUNTER — TELEPHONE (OUTPATIENT)
Age: 85
End: 2025-05-13

## 2025-05-13 NOTE — TELEPHONE ENCOUNTER
Thao from Dr. Bassett Dentist office called and stated that the patient had informed them that with her upcoming procedure for dental work she was told she needed to take antibiotics Cipro for 1 week prior to procedure and that her urologist advised her. Dr. Whaley office is asking to be advised. Thank you

## 2025-05-14 NOTE — TELEPHONE ENCOUNTER
Writer called and spoke with Dr. Whaley dental office and informed them that provider will not prescribe antibiotics for dental procedures. Dr. Whaley office verbalized understanding. Thank you

## 2025-06-17 ENCOUNTER — HOSPITAL ENCOUNTER (OUTPATIENT)
Age: 85
Setting detail: SPECIMEN
Discharge: HOME | End: 2025-06-17
Payer: MEDICARE

## 2025-06-17 PROCEDURE — 87086 URINE CULTURE/COLONY COUNT: CPT

## 2025-06-17 PROCEDURE — 87186 SC STD MICRODIL/AGAR DIL: CPT

## 2025-06-17 PROCEDURE — 87077 CULTURE AEROBIC IDENTIFY: CPT

## 2025-06-19 ENCOUNTER — RESULTS FOLLOW-UP (OUTPATIENT)
Age: 85
End: 2025-06-19

## 2025-06-19 LAB
MICROORGANISM SPEC CULT: ABNORMAL
SPECIMEN DESCRIPTION: ABNORMAL

## 2025-07-06 ENCOUNTER — HOSPITAL ENCOUNTER (EMERGENCY)
Age: 85
Discharge: HOME | End: 2025-07-06
Attending: EMERGENCY MEDICINE
Payer: MEDICARE

## 2025-07-06 VITALS
TEMPERATURE: 98.6 F | OXYGEN SATURATION: 98 % | RESPIRATION RATE: 12 BRPM | DIASTOLIC BLOOD PRESSURE: 56 MMHG | WEIGHT: 142 LBS | BODY MASS INDEX: 24.36 KG/M2 | HEART RATE: 60 BPM | SYSTOLIC BLOOD PRESSURE: 150 MMHG
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
## 2025-07-06 PROCEDURE — 99283 EMERGENCY DEPT VISIT LOW MDM: CPT

## 2025-07-06 RX ORDER — MUPIROCIN 2 %
OINTMENT (GRAM) TOPICAL
Qty: 22 G | Refills: 0 | Status: SHIPPED | OUTPATIENT
Start: 2025-07-06 | End: 2025-07-13

## 2025-07-06 ASSESSMENT — PAIN DESCRIPTION - LOCATION: LOCATION: ARM

## 2025-07-06 ASSESSMENT — PAIN DESCRIPTION - ORIENTATION: ORIENTATION: LEFT

## 2025-07-06 ASSESSMENT — PAIN SCALES - GENERAL: PAINLEVEL_OUTOF10: 1

## 2025-07-06 NOTE — ED PROVIDER NOTES
[facility] EMERGENCY DEPARTMENT  eMERGENCY dEPARTMENT eNCOUnter   Independent Attestation     Pt Name: Junior Soto  MRN: [id number]  Birthdate 1940  Date of evaluation: 7/6/25       Junior Soto is a 85 y.o. female who presents with Abrasion (Dog scratched left arm today and pt states it bled alot)        Based on the medical record, the care appears appropriate. I was personally available for consultation in the Emergency Department.    Hema Miller MD  Attending Emergency  Physician               Hema Miller MD  07/06/25 1107

## 2025-07-06 NOTE — DISCHARGE INSTRUCTIONS
Take your medication as indicated and prescribed.    Wound check with dermatology on Tuesday as planned.    Wash wound with soap and water every day. Pat dry and apply bactroban ointment 2-3 times daily until healed.     For pain use acetaminophen (Tylenol) or ibuprofen (Motrin / Advil), unless prescribed medications that have acetaminophen or ibuprofen (or similar medications) in it.  You can take over the counter acetaminophen tablets (1 - 2 tablets of the 500-mg strength every 6 hours) or ibuprofen tablets (2 tablets every 4 hours).    PLEASE RETURN TO THE EMERGENCY DEPARTMENT IMMEDIATELY for worsening symptoms, white drainage from the wound, redness or streaking, or if you develop any concerning symptoms such as: high fever not relieved by acetaminophen (Tylenol) and/or ibuprofen (Motrin / Advil), chills, shortness of breath, chest pain, feeling of your heart fluttering or racing, persistent nausea and/or vomiting, vomiting up blood, blood in your stool, loss of consciousness, numbness, weakness or tingling in the arms or legs or change in color of the extremities, changes in mental status, persistent headache, blurry vision, loss of bladder / bowel control, unable to follow up with your physician, or other any other care or concern.

## 2025-07-10 ASSESSMENT — ENCOUNTER SYMPTOMS
VOMITING: 0
SHORTNESS OF BREATH: 0
ABDOMINAL PAIN: 0
COLOR CHANGE: 0
NAUSEA: 0

## 2025-07-10 NOTE — ED PROVIDER NOTES
[facility] Emergency Department  [address]  [place] [address]  Phone: [phone]  Fax: [phone]        Pt Name: Junior Soto  MRN: [id number]  Birthdate 1940  Date of evaluation: 7/10/25    CHIEFCOMPLAINT       Chief Complaint   Patient presents with    Abrasion     Dog scratched left arm today and pt states it bled alot       HISTORY OF PRESENT ILLNESS (Location/Symptom, Timing/Onset, Context/Setting, Quality, Duration, Modifying Factors, Severity)      Junior Soto is a 85 y.o. female with no pertinent PMH who presents to the ED via private auto with complaint of a dog scratch to the left arm.  Patient states she was not going to come in to have this evaluated however was unsure how to clean the wound and what to apply.  Patient very concerned for infection.  Tetanus up-to-date.  Bleeding controlled upon arrival.  Pain 1 out of 10.  Scratch to the left forearm, no wrist or elbow pain to left upper extremity.    PAST MEDICAL / SURGICAL / SOCIAL / FAMILY HISTORY     PMH:  has a past medical history of Acute pancreatitis without infection or necrosis, Anxiety, CAD (coronary artery disease), CKD (chronic kidney disease), Diabetes mellitus (HCC), Gastroesophageal reflux disease, Glaucoma, Hepatic cirrhosis (HCC), Hyperlipidemia, Hypertension, and Recurrent UTI.  Surgical History:  has a past surgical history that includes Appendectomy; Hysterectomy; Colonoscopy; Cardiac catheterization; Cholecystectomy, laparoscopic (2023); Cholecystectomy, laparoscopic (N/A, 2023); and Upper gastrointestinal endoscopy (N/A, 2024).  Social History:  reports that she quit smoking about 45 years ago. Her smoking use included cigarettes. She has never used smokeless tobacco. She reports that she does not drink alcohol and does not use drugs.  Family History: She indicated that her mother is . She indicated that her father is .   family history is not on

## 2025-07-24 ENCOUNTER — HOSPITAL ENCOUNTER (OUTPATIENT)
Dept: MAMMOGRAPHY | Age: 85
Discharge: HOME | End: 2025-07-26
Payer: MEDICARE

## 2025-07-24 VITALS — WEIGHT: 142 LBS | BODY MASS INDEX: 24.36 KG/M2

## 2025-07-24 DIAGNOSIS — Z12.31 OTHER SCREENING MAMMOGRAM: ICD-10-CM

## 2025-07-24 PROCEDURE — 77063 BREAST TOMOSYNTHESIS BI: CPT

## 2025-08-06 ENCOUNTER — HOSPITAL ENCOUNTER (OUTPATIENT)
Age: 85
Setting detail: SPECIMEN
Discharge: HOME | End: 2025-08-06
Payer: MEDICARE

## 2025-08-06 PROCEDURE — 87186 SC STD MICRODIL/AGAR DIL: CPT

## 2025-08-06 PROCEDURE — 87086 URINE CULTURE/COLONY COUNT: CPT

## 2025-08-06 PROCEDURE — 87077 CULTURE AEROBIC IDENTIFY: CPT

## 2025-08-08 LAB
MICROORGANISM SPEC CULT: ABNORMAL
SPECIMEN DESCRIPTION: ABNORMAL

## 2025-08-08 RX ORDER — NITROFURANTOIN 25; 75 MG/1; MG/1
100 CAPSULE ORAL 2 TIMES DAILY
Qty: 20 CAPSULE | Refills: 0 | Status: SHIPPED | OUTPATIENT
Start: 2025-08-08 | End: 2025-08-18

## 2025-08-11 ENCOUNTER — TELEPHONE (OUTPATIENT)
Age: 85
End: 2025-08-11

## (undated) DEVICE — BLANKET WRM W29.9XL79.1IN UP BODY FORC AIR MISTRAL-AIR

## (undated) DEVICE — DRAPE,REIN 53X77,STERILE: Brand: MEDLINE

## (undated) DEVICE — TROCAR: Brand: KII FIOS FIRST ENTRY

## (undated) DEVICE — PERRYSBURG ENDO PACK: Brand: MEDLINE INDUSTRIES, INC.

## (undated) DEVICE — GLOVE ORANGE PI 7 1/2   MSG9075

## (undated) DEVICE — DRESSING TRNSPAR W5XL4.5IN FLM SHT SEMIPERMEABLE WIND

## (undated) DEVICE — SYRINGE ONLY,20ML LUER LOCK: Brand: MEDLINE INDUSTRIES, INC.

## (undated) DEVICE — SEAL

## (undated) DEVICE — DEVICE TRCR 12X9X3IN WHT CLSR DISP OMNICLOSE

## (undated) DEVICE — MHPB BASIC LAP PACK: Brand: MEDLINE INDUSTRIES, INC.

## (undated) DEVICE — LIQUIBAND RAPID ADHESIVE 36/CS 0.8ML: Brand: MEDLINE

## (undated) DEVICE — APPLICATOR MEDICATED 26 CC SOLUTION HI LT ORNG CHLORAPREP

## (undated) DEVICE — ELECTRODE PT RET AD L9FT HI MOIST COND ADH HYDRGEL CORDED

## (undated) DEVICE — STRAP,POSITIONING,KNEE/BODY,FOAM,4X60": Brand: MEDLINE

## (undated) DEVICE — EVACUATOR SURG 100CC SIL BLB SUCT RESVR FOR CLS WND DRNGE

## (undated) DEVICE — SUTURE MCRYL + SZ 4-0 L18IN ABSRB UD L19MM PS-2 3/8 CIR MCP496G

## (undated) DEVICE — INSUFFLATION NEEDLE TO ESTABLISH PNEUMOPERITONEUM.: Brand: INSUFFLATION NEEDLE

## (undated) DEVICE — SOLUTION IRRIG 1000ML 0.9% SOD CHL USP POUR PLAS BTL

## (undated) DEVICE — DRAIN SURG 19FR 100% SIL RADPQ RND CHN FULL FLUT

## (undated) DEVICE — SUTURE ETHLN SZ 3-0 L18IN NONABSORBABLE BLK PS-2 L19MM 3/8 1669H

## (undated) DEVICE — SYRINGE, LUER LOCK, 10ML: Brand: MEDLINE

## (undated) DEVICE — SOLUTION IRRIG 1000ML STRL H2O USP PLAS POUR BTL

## (undated) DEVICE — BLADELESS OBTURATOR: Brand: WECK VISTA

## (undated) DEVICE — SUTURE V-LOC 180 SZ 3-0 L6IN ABSRB GRN L17MM CV-23 1/2 CIR VLOCL0804

## (undated) DEVICE — VESSEL SEALER EXTEND: Brand: ENDOWRIST

## (undated) DEVICE — Device: Brand: DEFENDO VALVE AND CONNECTOR KIT

## (undated) DEVICE — PAD PT POS 36 IN SURGYPAD DISP

## (undated) DEVICE — SOLUTION ANTIFOG VIS SYS CLEARIFY LAPSCP

## (undated) DEVICE — SUCTION IRRIGATOR: Brand: ENDOWRIST

## (undated) DEVICE — ANCHOR TISSUE RETRIEVAL SYSTEM, BAG SIZE 125 ML, PORT SIZE 8 MM: Brand: ANCHOR TISSUE RETRIEVAL SYSTEM

## (undated) DEVICE — ARM DRAPE

## (undated) DEVICE — SOLUTION IV 1000ML 0.9% SOD CHL PH 5 INJ USP VIAFLX PLAS

## (undated) DEVICE — ADAPTER CLEANING PORPOISE CLEANING

## (undated) DEVICE — BITEBLOCK 54FR W/ DENT RIM BLOX

## (undated) DEVICE — STERILE POLYISOPRENE POWDER-FREE SURGICAL GLOVES WITH EMOLLIENT COATING: Brand: PROTEXIS

## (undated) DEVICE — SUTURE SZ 0 27IN 5/8 CIR UR-6  TAPER PT VIOLET ABSRB VICRYL J603H

## (undated) DEVICE — SPONGE DRN W4XL4IN RAYON/POLYESTER 6 PLY NONWOVEN PRECUT 2 PER PK